# Patient Record
Sex: FEMALE | Race: WHITE | Employment: UNEMPLOYED | URBAN - METROPOLITAN AREA
[De-identification: names, ages, dates, MRNs, and addresses within clinical notes are randomized per-mention and may not be internally consistent; named-entity substitution may affect disease eponyms.]

---

## 2017-09-30 ENCOUNTER — HOSPITAL ENCOUNTER (INPATIENT)
Age: 67
LOS: 3 days | Discharge: HOME | DRG: 149 | End: 2017-10-03
Attending: EMERGENCY MEDICINE | Admitting: INTERNAL MEDICINE
Payer: MEDICARE

## 2017-09-30 ENCOUNTER — APPOINTMENT (OUTPATIENT)
Dept: MRI IMAGING | Age: 67
DRG: 149 | End: 2017-09-30
Attending: INTERNAL MEDICINE
Payer: MEDICARE

## 2017-09-30 ENCOUNTER — APPOINTMENT (OUTPATIENT)
Dept: CT IMAGING | Age: 67
DRG: 149 | End: 2017-09-30
Attending: EMERGENCY MEDICINE
Payer: MEDICARE

## 2017-09-30 DIAGNOSIS — I25.10 ASCVD (ARTERIOSCLEROTIC CARDIOVASCULAR DISEASE): ICD-10-CM

## 2017-09-30 DIAGNOSIS — H81.23 VESTIBULAR NEURONITIS OF BOTH EARS: ICD-10-CM

## 2017-09-30 DIAGNOSIS — E78.5 HYPERLIPIDEMIA, UNSPECIFIED HYPERLIPIDEMIA TYPE: ICD-10-CM

## 2017-09-30 DIAGNOSIS — R42 DIZZINESS: ICD-10-CM

## 2017-09-30 DIAGNOSIS — H81.20 VESTIBULAR NEURONITIS, UNSPECIFIED LATERALITY: ICD-10-CM

## 2017-09-30 DIAGNOSIS — R11.2 INTRACTABLE VOMITING WITH NAUSEA, UNSPECIFIED VOMITING TYPE: ICD-10-CM

## 2017-09-30 DIAGNOSIS — R11.2 NAUSEA AND VOMITING, INTRACTABILITY OF VOMITING NOT SPECIFIED, UNSPECIFIED VOMITING TYPE: ICD-10-CM

## 2017-09-30 DIAGNOSIS — E78.00 PURE HYPERCHOLESTEROLEMIA: ICD-10-CM

## 2017-09-30 DIAGNOSIS — I63.9 CEREBROVASCULAR ACCIDENT (CVA), UNSPECIFIED MECHANISM (HCC): ICD-10-CM

## 2017-09-30 DIAGNOSIS — R42 VERTIGO: Primary | ICD-10-CM

## 2017-09-30 LAB
ALBUMIN SERPL-MCNC: 4 G/DL (ref 3.5–5)
ALBUMIN/GLOB SERPL: 1.3 {RATIO} (ref 1.1–2.2)
ALP SERPL-CCNC: 58 U/L (ref 45–117)
ALT SERPL-CCNC: 28 U/L (ref 12–78)
ANION GAP SERPL CALC-SCNC: 6 MMOL/L (ref 5–15)
APTT PPP: 24.5 SEC (ref 22.1–32.5)
AST SERPL-CCNC: 20 U/L (ref 15–37)
BASOPHILS # BLD: 0 K/UL (ref 0–0.1)
BASOPHILS NFR BLD: 0 % (ref 0–1)
BILIRUB SERPL-MCNC: 0.5 MG/DL (ref 0.2–1)
BUN SERPL-MCNC: 18 MG/DL (ref 6–20)
BUN/CREAT SERPL: 25 (ref 12–20)
CALCIUM SERPL-MCNC: 8.1 MG/DL (ref 8.5–10.1)
CHLORIDE SERPL-SCNC: 108 MMOL/L (ref 97–108)
CK MB CFR SERPL CALC: 1 % (ref 0–2.5)
CK MB SERPL-MCNC: 1.6 NG/ML (ref 5–25)
CK SERPL-CCNC: 163 U/L (ref 26–192)
CO2 SERPL-SCNC: 26 MMOL/L (ref 21–32)
CREAT BLD-MCNC: 0.7 MG/DL (ref 0.6–1.3)
CREAT SERPL-MCNC: 0.72 MG/DL (ref 0.55–1.02)
EOSINOPHIL # BLD: 0 K/UL (ref 0–0.4)
EOSINOPHIL NFR BLD: 0 % (ref 0–7)
ERYTHROCYTE [DISTWIDTH] IN BLOOD BY AUTOMATED COUNT: 12.6 % (ref 11.5–14.5)
GLOBULIN SER CALC-MCNC: 3.1 G/DL (ref 2–4)
GLUCOSE SERPL-MCNC: 123 MG/DL (ref 65–100)
HCT VFR BLD AUTO: 36.9 % (ref 35–47)
HGB BLD-MCNC: 12.5 G/DL (ref 11.5–16)
INR PPP: 1 (ref 0.9–1.1)
LYMPHOCYTES # BLD: 0.9 K/UL (ref 0.8–3.5)
LYMPHOCYTES NFR BLD: 9 % (ref 12–49)
MAGNESIUM SERPL-MCNC: 1.8 MG/DL (ref 1.6–2.4)
MCH RBC QN AUTO: 30.1 PG (ref 26–34)
MCHC RBC AUTO-ENTMCNC: 33.9 G/DL (ref 30–36.5)
MCV RBC AUTO: 88.9 FL (ref 80–99)
MONOCYTES # BLD: 0.5 K/UL (ref 0–1)
MONOCYTES NFR BLD: 6 % (ref 5–13)
NEUTS SEG # BLD: 8.1 K/UL (ref 1.8–8)
NEUTS SEG NFR BLD: 85 % (ref 32–75)
PLATELET # BLD AUTO: 182 K/UL (ref 150–400)
POTASSIUM SERPL-SCNC: 3.6 MMOL/L (ref 3.5–5.1)
PROT SERPL-MCNC: 7.1 G/DL (ref 6.4–8.2)
PROTHROMBIN TIME: 10.3 SEC (ref 9–11.1)
RBC # BLD AUTO: 4.15 M/UL (ref 3.8–5.2)
SODIUM SERPL-SCNC: 140 MMOL/L (ref 136–145)
THERAPEUTIC RANGE,PTTT: NORMAL SECS (ref 58–77)
TROPONIN I SERPL-MCNC: <0.04 NG/ML
WBC # BLD AUTO: 9.5 K/UL (ref 3.6–11)

## 2017-09-30 PROCEDURE — 99285 EMERGENCY DEPT VISIT HI MDM: CPT

## 2017-09-30 PROCEDURE — 82550 ASSAY OF CK (CPK): CPT | Performed by: EMERGENCY MEDICINE

## 2017-09-30 PROCEDURE — 74011636320 HC RX REV CODE- 636/320: Performed by: EMERGENCY MEDICINE

## 2017-09-30 PROCEDURE — 96374 THER/PROPH/DIAG INJ IV PUSH: CPT

## 2017-09-30 PROCEDURE — 93005 ELECTROCARDIOGRAM TRACING: CPT

## 2017-09-30 PROCEDURE — 85025 COMPLETE CBC W/AUTO DIFF WBC: CPT | Performed by: EMERGENCY MEDICINE

## 2017-09-30 PROCEDURE — 80053 COMPREHEN METABOLIC PANEL: CPT | Performed by: EMERGENCY MEDICINE

## 2017-09-30 PROCEDURE — 74011250636 HC RX REV CODE- 250/636: Performed by: EMERGENCY MEDICINE

## 2017-09-30 PROCEDURE — 65660000000 HC RM CCU STEPDOWN

## 2017-09-30 PROCEDURE — 70450 CT HEAD/BRAIN W/O DYE: CPT

## 2017-09-30 PROCEDURE — 36415 COLL VENOUS BLD VENIPUNCTURE: CPT | Performed by: EMERGENCY MEDICINE

## 2017-09-30 PROCEDURE — 74011250637 HC RX REV CODE- 250/637: Performed by: EMERGENCY MEDICINE

## 2017-09-30 PROCEDURE — 70496 CT ANGIOGRAPHY HEAD: CPT

## 2017-09-30 PROCEDURE — 82565 ASSAY OF CREATININE: CPT

## 2017-09-30 PROCEDURE — 83735 ASSAY OF MAGNESIUM: CPT | Performed by: EMERGENCY MEDICINE

## 2017-09-30 PROCEDURE — 85730 THROMBOPLASTIN TIME PARTIAL: CPT | Performed by: EMERGENCY MEDICINE

## 2017-09-30 PROCEDURE — 74011250636 HC RX REV CODE- 250/636: Performed by: INTERNAL MEDICINE

## 2017-09-30 PROCEDURE — 85610 PROTHROMBIN TIME: CPT | Performed by: EMERGENCY MEDICINE

## 2017-09-30 PROCEDURE — 74011250637 HC RX REV CODE- 250/637: Performed by: INTERNAL MEDICINE

## 2017-09-30 PROCEDURE — 84484 ASSAY OF TROPONIN QUANT: CPT | Performed by: EMERGENCY MEDICINE

## 2017-09-30 RX ORDER — ATORVASTATIN CALCIUM 20 MG/1
20 TABLET, FILM COATED ORAL
Status: DISCONTINUED | OUTPATIENT
Start: 2017-09-30 | End: 2017-10-03 | Stop reason: HOSPADM

## 2017-09-30 RX ORDER — DIAZEPAM 5 MG/1
5 TABLET ORAL
Status: COMPLETED | OUTPATIENT
Start: 2017-09-30 | End: 2017-09-30

## 2017-09-30 RX ORDER — SODIUM CHLORIDE 9 MG/ML
75 INJECTION, SOLUTION INTRAVENOUS CONTINUOUS
Status: DISPENSED | OUTPATIENT
Start: 2017-09-30 | End: 2017-10-01

## 2017-09-30 RX ORDER — MECLIZINE HCL 12.5 MG 12.5 MG/1
25 TABLET ORAL
Status: COMPLETED | OUTPATIENT
Start: 2017-09-30 | End: 2017-09-30

## 2017-09-30 RX ORDER — SODIUM CHLORIDE 0.9 % (FLUSH) 0.9 %
10 SYRINGE (ML) INJECTION
Status: COMPLETED | OUTPATIENT
Start: 2017-09-30 | End: 2017-09-30

## 2017-09-30 RX ORDER — GUAIFENESIN 100 MG/5ML
81 LIQUID (ML) ORAL DAILY
Status: DISCONTINUED | OUTPATIENT
Start: 2017-10-01 | End: 2017-10-03 | Stop reason: HOSPADM

## 2017-09-30 RX ORDER — ACETAMINOPHEN 325 MG/1
650 TABLET ORAL
Status: DISCONTINUED | OUTPATIENT
Start: 2017-09-30 | End: 2017-10-03 | Stop reason: HOSPADM

## 2017-09-30 RX ORDER — MORPHINE SULFATE 4 MG/ML
1 INJECTION, SOLUTION INTRAMUSCULAR; INTRAVENOUS
Status: DISCONTINUED | OUTPATIENT
Start: 2017-09-30 | End: 2017-10-03 | Stop reason: HOSPADM

## 2017-09-30 RX ORDER — SODIUM CHLORIDE 0.9 % (FLUSH) 0.9 %
5-10 SYRINGE (ML) INJECTION EVERY 8 HOURS
Status: DISCONTINUED | OUTPATIENT
Start: 2017-09-30 | End: 2017-10-03 | Stop reason: HOSPADM

## 2017-09-30 RX ORDER — LABETALOL HYDROCHLORIDE 5 MG/ML
5 INJECTION, SOLUTION INTRAVENOUS
Status: DISCONTINUED | OUTPATIENT
Start: 2017-09-30 | End: 2017-10-03 | Stop reason: HOSPADM

## 2017-09-30 RX ORDER — ATORVASTATIN CALCIUM 20 MG/1
10 TABLET, FILM COATED ORAL DAILY
Status: ON HOLD | COMMUNITY
End: 2020-07-21

## 2017-09-30 RX ORDER — LORAZEPAM 2 MG/ML
2 INJECTION INTRAMUSCULAR ONCE
Status: COMPLETED | OUTPATIENT
Start: 2017-09-30 | End: 2017-09-30

## 2017-09-30 RX ORDER — SODIUM CHLORIDE 0.9 % (FLUSH) 0.9 %
5-10 SYRINGE (ML) INJECTION AS NEEDED
Status: DISCONTINUED | OUTPATIENT
Start: 2017-09-30 | End: 2017-10-03 | Stop reason: HOSPADM

## 2017-09-30 RX ORDER — MECLIZINE HCL 12.5 MG 12.5 MG/1
25 TABLET ORAL EVERY 6 HOURS
Status: DISCONTINUED | OUTPATIENT
Start: 2017-09-30 | End: 2017-10-03 | Stop reason: HOSPADM

## 2017-09-30 RX ORDER — SODIUM CHLORIDE 9 MG/ML
50 INJECTION, SOLUTION INTRAVENOUS
Status: COMPLETED | OUTPATIENT
Start: 2017-09-30 | End: 2017-09-30

## 2017-09-30 RX ORDER — LORAZEPAM 2 MG/ML
1 INJECTION INTRAMUSCULAR
Status: DISCONTINUED | OUTPATIENT
Start: 2017-09-30 | End: 2017-10-03 | Stop reason: HOSPADM

## 2017-09-30 RX ORDER — ONDANSETRON 2 MG/ML
4 INJECTION INTRAMUSCULAR; INTRAVENOUS
Status: DISCONTINUED | OUTPATIENT
Start: 2017-09-30 | End: 2017-10-03 | Stop reason: HOSPADM

## 2017-09-30 RX ORDER — ENOXAPARIN SODIUM 100 MG/ML
30 INJECTION SUBCUTANEOUS EVERY 24 HOURS
Status: DISCONTINUED | OUTPATIENT
Start: 2017-09-30 | End: 2017-10-03 | Stop reason: HOSPADM

## 2017-09-30 RX ORDER — MECLIZINE HCL 12.5 MG 12.5 MG/1
25 TABLET ORAL
Status: DISCONTINUED | OUTPATIENT
Start: 2017-09-30 | End: 2017-09-30

## 2017-09-30 RX ORDER — ONDANSETRON 2 MG/ML
4 INJECTION INTRAMUSCULAR; INTRAVENOUS
Status: COMPLETED | OUTPATIENT
Start: 2017-09-30 | End: 2017-09-30

## 2017-09-30 RX ORDER — ACETAMINOPHEN 650 MG/1
650 SUPPOSITORY RECTAL
Status: DISCONTINUED | OUTPATIENT
Start: 2017-09-30 | End: 2017-10-03 | Stop reason: HOSPADM

## 2017-09-30 RX ADMIN — MECLIZINE 25 MG: 12.5 TABLET ORAL at 14:29

## 2017-09-30 RX ADMIN — DIAZEPAM 5 MG: 5 TABLET ORAL at 16:39

## 2017-09-30 RX ADMIN — SODIUM CHLORIDE 50 ML/HR: 900 INJECTION, SOLUTION INTRAVENOUS at 15:03

## 2017-09-30 RX ADMIN — ATORVASTATIN CALCIUM 20 MG: 20 TABLET, FILM COATED ORAL at 22:34

## 2017-09-30 RX ADMIN — IOPAMIDOL 100 ML: 755 INJECTION, SOLUTION INTRAVENOUS at 15:03

## 2017-09-30 RX ADMIN — SODIUM CHLORIDE 75 ML/HR: 900 INJECTION, SOLUTION INTRAVENOUS at 18:57

## 2017-09-30 RX ADMIN — MECLIZINE 25 MG: 12.5 TABLET ORAL at 18:55

## 2017-09-30 RX ADMIN — ONDANSETRON 4 MG: 2 INJECTION INTRAMUSCULAR; INTRAVENOUS at 14:45

## 2017-09-30 RX ADMIN — ENOXAPARIN SODIUM 30 MG: 30 INJECTION SUBCUTANEOUS at 18:56

## 2017-09-30 RX ADMIN — Medication 10 ML: at 15:03

## 2017-09-30 RX ADMIN — Medication 10 ML: at 22:34

## 2017-09-30 RX ADMIN — LORAZEPAM 2 MG: 2 INJECTION INTRAMUSCULAR; INTRAVENOUS at 18:56

## 2017-09-30 NOTE — ED NOTES
Bedside and Verbal shift change report given to Kelsey Brito RN (oncoming nurse) by Opal Gonzalez RN (offgoing nurse). Report included the following information SBAR, Kardex, ED Summary, MAR, Recent Results and Cardiac Rhythm Sinus Rhythm.

## 2017-09-30 NOTE — ROUTINE PROCESS
TRANSFER - OUT REPORT:    Verbal report given to [facility] RN (name) on Joellen Shells  being transferred to Room 3118 Neuro Tele (unit) for routine progression of care       Report consisted of patients Situation, Background, Assessment and   Recommendations(SBAR). Information from the following report(s) SBAR, ED Summary, Intake/Output and Recent Results was reviewed with the receiving nurse. Lines:   Peripheral IV 09/30/17 Right Antecubital (Active)   Site Assessment Clean, dry, & intact 9/30/2017  2:32 PM   Phlebitis Assessment 0 9/30/2017  2:32 PM   Infiltration Assessment 0 9/30/2017  2:32 PM   Dressing Status Clean, dry, & intact 9/30/2017  2:32 PM   Dressing Type Tape;Transparent 9/30/2017  2:32 PM   Hub Color/Line Status Pink;Flushed 9/30/2017  2:32 PM       Peripheral IV 09/30/17 Left Antecubital (Active)   Site Assessment Clean, dry, & intact 9/30/2017  7:48 PM   Phlebitis Assessment 0 9/30/2017  7:48 PM   Infiltration Assessment 0 9/30/2017  7:48 PM   Dressing Status Clean, dry, & intact 9/30/2017  7:48 PM   Dressing Type Transparent 9/30/2017  7:48 PM   Hub Color/Line Status Pink;Flushed 9/30/2017  7:48 PM   Alcohol Cap Used Yes 9/30/2017  7:48 PM        Opportunity for questions and clarification was provided.

## 2017-09-30 NOTE — IP AVS SNAPSHOT
[address] [facility] 
[phone] Patient: Perry Madsen MRN: [id number] RCE:7/25/4580 Current Discharge Medication List  
  
START taking these medications Dose & Instructions Dispensing Information Comments Morning Noon Evening Bedtime  
 meclizine 25 mg tablet Commonly known as:  ANTIVERT Your last dose was: Your next dose is:    
   
   
 Dose:  25 mg Take 1 Tab by mouth three (3) times daily as needed for up to 10 days. Indications: VERTIGO Quantity:  21 Tab Refills:  0 CONTINUE these medications which have NOT CHANGED Dose & Instructions Dispensing Information Comments Morning Noon Evening Bedtime LIPITOR 20 mg tablet Generic drug:  atorvastatin Your last dose was: Your next dose is: Take  by mouth daily. Refills:  0 Where to Get Your Medications These medications were sent to [address] [place] [address], [address] [place] [address] [address]  [address], [address] [place] [address] Phone:  [phone]  
  meclizine 25 mg tablet

## 2017-09-30 NOTE — Clinical Note
Status[de-identified] Inpatient [101] Type of Bed: Telemetry [19] Inpatient Hospitalization Certified Necessary for the Following Reasons: 3. Patient receiving treatment that can only be provided in an inpatient setting (further clarification in H&P documentation) Admitting Diagnosis: CVA (cerebral vascular accident) [facility]) [[id number]] Ad[facility] Mohsen Lewis, [facility] Attending Physician: Natalya Lin Estimated Length of Stay: 2 Midnights Discharge Plan[de-identified] Home with Office Follow-up

## 2017-09-30 NOTE — H&P
Hospitalist Admission Note    NAME: Juanita Horn   :  1950   MRN:  [id number]     Date/Time:  2017 5:22 PM    Patient PCP: Imelda South MD  ________________________________________________________________________    My assessment of this patient's clinical condition and my plan of care is as follows. Assessment / Plan:  CVA vs BPV  -presenting symptoms: dizziness with intermittent nausea, vomiting  -head CT negative for acute process  -CTA neg for major vessel occlusion, aneurysm, dissection, intraluminal thrombus, or significant stenosis  -admit to telemetry  -will order MRI to r/o posterior circ CVA  -Echo ordered  -check CVA labs: TSH, lipid panel, A1C  -labetalol prn for permissive HTN  -scheduled meclizine, ativan prn for symptom control  -PT/OT/CM   -neurology consulted    CAD  Hyperlipidemia  -on statin, will resume  -pt follows with Dr Maddison Tsang for ectopy. Had multiple CC MRI and cardiac stress test in the past with negative result        Code Status: Full  Surrogate Decision Maker: daughter and   DVT Prophylaxis: lovenox  GI Prophylaxis: not indicated  Baseline: independent        Subjective:   CHIEF COMPLAINT: dizziness, nausea, vomiting    HISTORY OF PRESENT ILLNESS:     Juanita Horn is a 74 y.o.  female w pmhx significant for CAD and factor V heterogenous, present to ED complaining of dizziness associated with nausea, photophobia, and vomiting, all started earlier today while driving. Denies any similar symptoms in the past.  She states she was driving to her daughter's house today and had to pull over due to severe dizziness with distortion of her vision. Daughter picked pt up and that's when she vomited multiple times prior to ED arrival.  Other associated symptoms including, generalized weakness with unsteadiness with ambulation and room spinning with positional changes.   She denies any fever, chills, HA,   In the ER, vitals: T98, P 63, NINA 165/78, SpO2 99%  Labs reviewed, UA neg . CT head negative, CTA neg. We were asked to admit for work up and evaluation of the above problems. Past Medical History:   Diagnosis Date    CAD (coronary artery disease)         History reviewed. No pertinent surgical history. Social History   Substance Use Topics    Smoking status: Never Smoker    Smokeless tobacco: Not on file    Alcohol use No        History reviewed. No pertinent family history. No Known Allergies     Prior to Admission medications    Medication Sig Start Date End Date Taking? Authorizing Provider   atorvastatin (LIPITOR) 20 mg tablet Take  by mouth daily. Yes Phys Other, MD       REVIEW OF SYSTEMS:     I am not able to complete the review of systems because:    The patient is intubated and sedated    The patient has altered mental status due to his acute medical problems    The patient has baseline aphasia from prior stroke(s)    The patient has baseline dementia and is not reliable historian    The patient is in acute medical distress and unable to provide information           Total of 12 systems reviewed as follows:       POSITIVE= BOLD text  Negative = text not underlined  General:  fever, chills, sweats, generalized weakness, weight loss/gain,      loss of appetite   Eyes:    blurred vision, eye pain, loss of vision, double vision  ENT:    rhinorrhea, pharyngitis   Respiratory:   cough, sputum production, SOB, LÓPEZ, wheezing, pleuritic pain   Cardiology:   chest pain, palpitations, orthopnea, PND, edema, syncope   Gastrointestinal:  abdominal pain , N/V, diarrhea, dysphagia, constipation, bleeding   Genitourinary:  frequency, urgency, dysuria, hematuria, incontinence   Muskuloskeletal :  arthralgia, myalgia, back pain  Hematology:  easy bruising, nose or gum bleeding, lymphadenopathy   Dermatological: rash, ulceration, pruritis, color change / jaundice  Endocrine:   hot flashes or polydipsia   Neurological: headache, dizziness, confusion, focal weakness, paresthesia,     Speech difficulties, memory loss, gait difficulty  Psychological: Feelings of anxiety, depression, agitation    Objective:   VITALS:    Visit Vitals    /71    Pulse 61    Temp 98 °F (36.7 °C)    Resp 15    Ht 5' 7\" (1.702 m)    Wt 59 kg (130 lb)    SpO2 99%    BMI 20.36 kg/m2       PHYSICAL EXAM:    General:    Alert, cooperative, no distress, appears stated age. HEENT: Atraumatic, anicteric sclerae, b/l horizontal nystagmus     No oral ulcers, mucosa moist, throat clear, dentition fair  Neck:  Supple, symmetrical,  thyroid: non tender  Lungs:   Clear to auscultation bilaterally. No Wheezing or Rhonchi. No rales. Chest wall:  No tenderness  No Accessory muscle use. Heart:   Regular  rhythm,  No  murmur   No edema  Abdomen:   Soft, non-tender. Not distended. Bowel sounds normal  Extremities: No cyanosis. No clubbing,      Skin turgor normal, Capillary refill normal, Radial dial pulse 2+  Skin:     Not pale. Not Jaundiced  No rashes   Psych:   Not anxious or agitated. Neurologic: B/l resting nystagmus. No facial asymmetry. No aphasia or slurred speech. Symmetrical strength, Sensation grossly intact.  Alert and oriented X 4.     _______________________________________________________________________  Care Plan discussed with:    Comments   Patient x    Family      RN x    Care Manager                    Consultant:  x ED physician   _______________________________________________________________________  Expected  Disposition:   Home with Family    HH/PT/OT/RN x   SNF/LTC    DARRION    ________________________________________________________________________  TOTAL TIME:  72 Minutes    Critical Care Provided     Minutes non procedure based      Comments    x Reviewed previous records   >50% of visit spent in counseling and coordination of care  Discussion with patient and/or family and questions answered ________________________________________________________________________  Signed: Micha Manzanares MD    Procedures: see electronic medical records for all procedures/Xrays and details which were not copied into this note but were reviewed prior to creation of Plan. LAB DATA REVIEWED:    Recent Results (from the past 24 hour(s))   EKG, 12 LEAD, INITIAL    Collection Time: 09/30/17 12:22 PM   Result Value Ref Range    Ventricular Rate 59 BPM    Atrial Rate 59 BPM    P-R Interval 142 ms    QRS Duration 98 ms    Q-T Interval 472 ms    QTC Calculation (Bezet) 467 ms    Calculated P Axis 58 degrees    Calculated R Axis 34 degrees    Calculated T Axis 33 degrees    Diagnosis       Sinus bradycardia  Otherwise normal ECG  No previous ECGs available     CBC WITH AUTOMATED DIFF    Collection Time: 09/30/17  2:33 PM   Result Value Ref Range    WBC 9.5 3.6 - 11.0 K/uL    RBC 4.15 3.80 - 5.20 M/uL    HGB 12.5 11.5 - 16.0 g/dL    HCT 36.9 35.0 - 47.0 %    MCV 88.9 80.0 - 99.0 FL    MCH 30.1 26.0 - 34.0 PG    MCHC 33.9 30.0 - 36.5 g/dL    RDW 12.6 11.5 - 14.5 %    PLATELET 716 462 - 758 K/uL    NEUTROPHILS 85 (H) 32 - 75 %    LYMPHOCYTES 9 (L) 12 - 49 %    MONOCYTES 6 5 - 13 %    EOSINOPHILS 0 0 - 7 %    BASOPHILS 0 0 - 1 %    ABS. NEUTROPHILS 8.1 (H) 1.8 - 8.0 K/UL    ABS. LYMPHOCYTES 0.9 0.8 - 3.5 K/UL    ABS. MONOCYTES 0.5 0.0 - 1.0 K/UL    ABS. EOSINOPHILS 0.0 0.0 - 0.4 K/UL    ABS.  BASOPHILS 0.0 0.0 - 0.1 K/UL   METABOLIC PANEL, COMPREHENSIVE    Collection Time: 09/30/17  2:33 PM   Result Value Ref Range    Sodium 140 136 - 145 mmol/L    Potassium 3.6 3.5 - 5.1 mmol/L    Chloride 108 97 - 108 mmol/L    CO2 26 21 - 32 mmol/L    Anion gap 6 5 - 15 mmol/L    Glucose 123 (H) 65 - 100 mg/dL    BUN 18 6 - 20 MG/DL    Creatinine 0.72 0.55 - 1.02 MG/DL    BUN/Creatinine ratio 25 (H) 12 - 20      GFR est AA >60 >60 ml/min/1.73m2    GFR est non-AA >60 >60 ml/min/1.73m2    Calcium 8.1 (L) 8.5 - 10.1 MG/DL    Bilirubin, total 0.5 0.2 - 1.0 MG/DL    ALT (SGPT) 28 12 - 78 U/L    AST (SGOT) 20 15 - 37 U/L    Alk.  phosphatase 58 45 - 117 U/L    Protein, total 7.1 6.4 - 8.2 g/dL    Albumin 4.0 3.5 - 5.0 g/dL    Globulin 3.1 2.0 - 4.0 g/dL    A-G Ratio 1.3 1.1 - 2.2     CK W/ CKMB & INDEX    Collection Time: 09/30/17  2:33 PM   Result Value Ref Range     26 - 192 U/L    CK - MB 1.6 <3.6 NG/ML    CK-MB Index 1.0 0 - 2.5     MAGNESIUM    Collection Time: 09/30/17  2:33 PM   Result Value Ref Range    Magnesium 1.8 1.6 - 2.4 mg/dL   TROPONIN I    Collection Time: 09/30/17  2:33 PM   Result Value Ref Range    Troponin-I, Qt. <0.04 <0.05 ng/mL   PROTHROMBIN TIME + INR    Collection Time: 09/30/17  2:33 PM   Result Value Ref Range    INR 1.0 0.9 - 1.1      Prothrombin time 10.3 9.0 - 11.1 sec   PTT    Collection Time: 09/30/17  2:33 PM   Result Value Ref Range    aPTT 24.5 22.1 - 32.5 sec    aPTT, therapeutic range     58.0 - 77.0 SECS   POC CREATININE    Collection Time: 09/30/17  2:34 PM   Result Value Ref Range    Creatinine (POC) 0.7 0.6 - 1.3 MG/DL    GFRAA, POC >60 >60 ml/min/1.73m2    GFRNA, POC >60 >60 ml/min/1.73m2

## 2017-09-30 NOTE — ED NOTES
Bedside shift change report given to Kelsey Brito RN (oncoming nurse) by Gopal Philip (offgoing nurse). Report included the following information SBAR, ED Summary, MAR and Recent Results. Assumed care of patient who is lying quietly on the stretcher in no apparent distress. Pt is alert and oriented x 4. Respirations are even and unlabored. No needs are expressed at this time.

## 2017-09-30 NOTE — IP AVS SNAPSHOT
[address] [facility] 
[phone] Patient: Qiana Reyes MRN: [id number] VGD:9/15/8389 You are allergic to the following No active allergies Recent Documentation Height Weight Breastfeeding? BMI OB Status Smoking Status 1.702 m 59 kg No 20.36 kg/m2 Postmenopausal Never Smoker Emergency Contacts Name Discharge Info Relation Home Work Mobile [address] CAREGIVER [3] Spouse [3] [phone] [address] [facility] CAREGIVER [3] Child [2] [phone] Jacky Alvarado CAREGIVER [3]  [phone] Mortimer Fish  Sister [23] [phone] About your hospitalization You were admitted on:  September 30, 2017 You last received care in the:  [facility] 3 NEUROSCIENCE TELEMETRY You were discharged on:  October 3, 2017 Unit phone number:  [phone] Why you were hospitalized Your primary diagnosis was:  Vestibular Neuronitis Of Both Ears Your diagnoses also included:  Dizziness, Nausea & Vomiting, Hyperlipidemia, Ascvd (Arteriosclerotic Cardiovascular Disease) Providers Seen During Your Hospitalizations Provider Role Specialty Primary office phone Angelica Del Rio DO Attending Provider Emergency Medicine [phone] Leodan Dhaliwal MD Attending Provider Internal Medicine [phone] Your Primary Care Physician (PCP) Primary Care Physician Office Phone Office Fax Niecy Genao [phone] Follow-up Information Follow up With Details Comments Contact Info Leodan Dhaliwal MD Schedule an appointment as soon as possible for a visit on 10/9/2017 You have a follow up visit scheduled for 1:00PM. Favian Aaron [facility] 
[phone] [facility] On 10/3/2017 This is the provider of your rolling walker. Please contact them with any questions. [address] [place] [address] 
[phone] [facility] On 10/3/2017 This will be the provider of your outpatient therapy. Please contact them with any questions. [phone] Your Appointments 2017  1:10 PM EDT Follow Up with MD Gerda Darling 26 ([address] [facility]) [address] [address] [phone] Current Discharge Medication List  
  
START taking these medications Dose & Instructions Dispensing Information Comments Morning Noon Evening Bedtime  
 meclizine 25 mg tablet Commonly known as:  ANTIVERT Your last dose was: Your next dose is:    
   
   
 Dose:  25 mg Take 1 Tab by mouth three (3) times daily as needed for up to 10 days. Indications: VERTIGO Quantity:  21 Tab Refills:  0 CONTINUE these medications which have NOT CHANGED Dose & Instructions Dispensing Information Comments Morning Noon Evening Bedtime LIPITOR 20 mg tablet Generic drug:  atorvastatin Your last dose was: Your next dose is: Take  by mouth daily. Refills:  0 Where to Get Your Medications These medications were sent to [address]  [address], Formerly named [facility] [address] [address] Phone:  [phone]  
  meclizine 25 mg tablet Discharge Instructions [facility] 76. [address] 
[phone] Patient Discharge Instructions [facility] / [id number] : 1950 Admitted 2017 Discharged: 10/3/2017 Take Home Medications · It is important that you take the medication exactly as they are prescribed. · Keep your medication in the bottles provided by the pharmacist and keep a list of the medication names, dosages, and times to be taken in your wallet. · Do not take other medications without consulting your doctor. What to do at [facility] Recommended diet: Regular Diet, Recommended activity: Activity as tolerated, Follow-up with Dr Adrian Kramer  in 1 week Information obtained by : 
I understand that if any problems occur once I am at home I am to contact my physician. I understand and acknowledge receipt of the instructions indicated above. Physician's or R.N.'s Signature                                                                  Date/Time Patient or Representative Signature                                                          Date/Time Discharge Orders None [employer] Announcement We are excited to announce that we are making your provider's discharge notes available to you in Kensho. You will see these notes when they are completed and signed by the physician that discharged you from your recent hospital stay. If you have any questions or concerns about any information you see in Kensho, please call the Health Information Department where you were seen or reach out to your Primary Care Provider for more information about your plan of care. Introducing [facility]! [facility] introduces K[employer]ho patient portal. Now you can access parts of your medical record, email your doctor's office, and request medication refills online. 1. In your internet browser, go to [email or web address] 2. Click on the First Time User? Click Here link in the Sign In box. You will see the New Member Sign Up page. 3. Enter your [employer] Access Code exactly as it appears below. You will not need to use this code after youve completed the sign-up process. If you do not sign up before the expiration date, you must request a new code. · [employer] Access Code: [email or web address] Expires: 12/29/2017 12:41 PM 
 
4. Enter the last four digits of your Social Security Number (xxxx) and Date of Birth (mm/dd/yyyy) as indicated and click Submit. You will be taken to the next sign-up page. 5. Create a [employer] ID. This will be your [employer] login ID and cannot be changed, so think of one that is secure and easy to remember. 6. Create a [employer] password. You can change your password at any time. 7. Enter your Password Reset Question and Answer. This can be used at a later time if you forget your password. 8. Enter your e-mail address. You will receive e-mail notification when new information is available in [address] 9. Click Sign Up. You can now view and download portions of your medical record. 10. Click the Download Summary menu link to download a portable copy of your medical information. If you have questions, please visit the Frequently Asked Questions section of the [employer] website. Remember, [employer] is NOT to be used for urgent needs. For medical emergencies, dial 911. Now available from your iPhone and Android! General Information Please provide this summary of care documentation to your next provider. Patient Signature:  ____________________________________________________________ Date:  ____________________________________________________________  
  
Greg Robison Provider Signature:  ____________________________________________________________ Date:  ____________________________________________________________

## 2017-09-30 NOTE — ED NOTES
Assist pt on and off bedpan. Pt is still dizzy. Pt did void on bedpan  She asked for an ice chip and a pillow  Having dry heave but not vomiting.    Pt is pale

## 2017-09-30 NOTE — ED PROVIDER NOTES
HPI Comments: Lola Lopez is a 74 y.o. female with PMhx significant for CAD and Factor V issues who presents via EMS with her family to the ED with cc of constant dizziness with intermittent nausea, chills, and photophobia. Pt states that she ate breakfast of eggs, toast, tea, and vitamin supplements. She then began driving to her daughter's house. She began to feel dizzy, but it passed after she pulled over for a few minutes. After she began driving again, she felt dizzy and nauseous. The nausea onset approximately 15 minutes after the dizziness. She pulled over and vomited once. Her dizziness feels like \"room spinning\". She was unable to walk due to feeling \"unsteady\" and called EMS. She vomited once en route and was administered 4 Zofran. In the ED, her nausea is mild but notes \"an effort to talk\". Pt takes Lipitor and had a 550 on the calcium heart scale when measured 3 years ago. Pt specifically denies cough/cold symptoms, headache, dysuria, hematuria, chest pain, SOB, taking blood thinners, or a history of kidney disease or vertigo. Social Hx: - Tobacco, - EtOH, - Illicit Drugs    PCP: Davidson Rutherford MD    There are no other complaints, changes or physical findings at this time. The history is provided by the patient. No  was used. Past Medical History:   Diagnosis Date    CAD (coronary artery disease)      History reviewed. No pertinent surgical history. History reviewed. No pertinent family history. Social History     Social History    Marital status:      Spouse name: N/A    Number of children: N/A    Years of education: N/A     Occupational History    Not on file.      Social History Main Topics    Smoking status: Never Smoker    Smokeless tobacco: Not on file    Alcohol use No    Drug use: No    Sexual activity: No     Other Topics Concern    Not on file     Social History Narrative     ALLERGIES: Review of patient's allergies indicates no known allergies. Review of Systems   Constitutional: Positive for chills. Negative for appetite change, fatigue and fever. HENT: Negative. Negative for congestion, ear pain, rhinorrhea, sinus pressure and sore throat. Eyes: Positive for photophobia. Respiratory: Negative. Negative for cough, choking, chest tightness, shortness of breath and wheezing. Cardiovascular: Negative. Negative for chest pain, palpitations and leg swelling. Gastrointestinal: Positive for nausea and vomiting. Negative for abdominal pain, constipation and diarrhea. Endocrine: Negative. Genitourinary: Negative. Negative for difficulty urinating, dysuria, flank pain, hematuria and urgency. Musculoskeletal: Negative. Skin: Negative. Neurological: Positive for dizziness. Negative for speech difficulty, weakness, light-headedness, numbness and headaches. Psychiatric/Behavioral: Negative. All other systems reviewed and are negative. Patient Vitals for the past 12 hrs:   Temp Pulse Resp BP SpO2   09/30/17 1600 - 61 15 139/71 99 %   09/30/17 1530 - 62 13 151/75 99 %   09/30/17 1430 - (!) 59 14 - 99 %   09/30/17 1400 - 60 13 152/69 -   09/30/17 1330 - 63 11 162/67 -   09/30/17 1300 - (!) 59 9 159/78 98 %   09/30/17 1222 98 °F (36.7 °C) 63 13 165/78 98 %     Physical Exam   Constitutional: She is oriented to person, place, and time. She appears well-developed and well-nourished. She appears distressed (Laying on stretcher, no movement, eyes closed). HENT:   Head: Normocephalic and atraumatic. Right Ear: External ear normal.   Left Ear: External ear normal.   Mouth/Throat: Oropharynx is clear and moist. No oropharyngeal exudate. Eyes: Conjunctivae and EOM are normal. Pupils are equal, round, and reactive to light. Neck: Normal range of motion. Neck supple. No JVD present. No tracheal deviation present. Cardiovascular: Normal rate, regular rhythm, normal heart sounds and intact distal pulses. No murmur heard. Pulmonary/Chest: Effort normal and breath sounds normal. No stridor. No respiratory distress. She has no wheezes. She has no rales. She exhibits no tenderness. Abdominal: Soft. She exhibits no distension. There is no tenderness. There is no rebound and no guarding. Musculoskeletal: Normal range of motion. She exhibits no edema or tenderness. Neurological: She is alert and oriented to person, place, and time. No cranial nerve deficit. No focal motor or sensory deficits, no dysmetria, resting nystagmus- non-fatigable   Skin: Skin is warm and dry. She is not diaphoretic. Psychiatric: She has a normal mood and affect. Her behavior is normal.   Nursing note and vitals reviewed. MDM  Number of Diagnoses or Management Options  Vertigo:   Diagnosis management comments: DDx: Cerebral stroke, vertigo, head bleed, vertebral artery dissection        Amount and/or Complexity of Data Reviewed  Clinical lab tests: ordered and reviewed  Tests in the radiology section of CPT®: ordered and reviewed  Tests in the medicine section of CPT®: ordered and reviewed  Review and summarize past medical records: yes  Discuss the patient with other providers: yes (Hospitalist)  Independent visualization of images, tracings, or specimens: yes    Patient Progress  Patient progress: stable    ED Course     Procedures     PROGRESS NOTE:  4:10 PM  Pt is still extremely dizzy. CONSULT NOTE:  4:15 PM  Mark Hammans, DO spoke with Dr. Charito Hutton,  Specialty: Hospitalist  Discussed pt's hx, disposition, and available diagnostic and imaging results. Reviewed care plans. Consultant recommends admitting the pt.      LABORATORY TESTS:  Recent Results (from the past 12 hour(s))   EKG, 12 LEAD, INITIAL    Collection Time: 09/30/17 12:22 PM   Result Value Ref Range    Ventricular Rate 59 BPM    Atrial Rate 59 BPM    P-R Interval 142 ms    QRS Duration 98 ms    Q-T Interval 472 ms    QTC Calculation (Bezet) 467 ms    Calculated P Axis 58 degrees    Calculated R Axis 34 degrees    Calculated T Axis 33 degrees    Diagnosis       Sinus bradycardia  Otherwise normal ECG  No previous ECGs available     CBC WITH AUTOMATED DIFF    Collection Time: 09/30/17  2:33 PM   Result Value Ref Range    WBC 9.5 3.6 - 11.0 K/uL    RBC 4.15 3.80 - 5.20 M/uL    HGB 12.5 11.5 - 16.0 g/dL    HCT 36.9 35.0 - 47.0 %    MCV 88.9 80.0 - 99.0 FL    MCH 30.1 26.0 - 34.0 PG    MCHC 33.9 30.0 - 36.5 g/dL    RDW 12.6 11.5 - 14.5 %    PLATELET 295 858 - 163 K/uL    NEUTROPHILS 85 (H) 32 - 75 %    LYMPHOCYTES 9 (L) 12 - 49 %    MONOCYTES 6 5 - 13 %    EOSINOPHILS 0 0 - 7 %    BASOPHILS 0 0 - 1 %    ABS. NEUTROPHILS 8.1 (H) 1.8 - 8.0 K/UL    ABS. LYMPHOCYTES 0.9 0.8 - 3.5 K/UL    ABS. MONOCYTES 0.5 0.0 - 1.0 K/UL    ABS. EOSINOPHILS 0.0 0.0 - 0.4 K/UL    ABS. BASOPHILS 0.0 0.0 - 0.1 K/UL   METABOLIC PANEL, COMPREHENSIVE    Collection Time: 09/30/17  2:33 PM   Result Value Ref Range    Sodium 140 136 - 145 mmol/L    Potassium 3.6 3.5 - 5.1 mmol/L    Chloride 108 97 - 108 mmol/L    CO2 26 21 - 32 mmol/L    Anion gap 6 5 - 15 mmol/L    Glucose 123 (H) 65 - 100 mg/dL    BUN 18 6 - 20 MG/DL    Creatinine 0.72 0.55 - 1.02 MG/DL    BUN/Creatinine ratio 25 (H) 12 - 20      GFR est AA >60 >60 ml/min/1.73m2    GFR est non-AA >60 >60 ml/min/1.73m2    Calcium 8.1 (L) 8.5 - 10.1 MG/DL    Bilirubin, total 0.5 0.2 - 1.0 MG/DL    ALT (SGPT) 28 12 - 78 U/L    AST (SGOT) 20 15 - 37 U/L    Alk.  phosphatase 58 45 - 117 U/L    Protein, total 7.1 6.4 - 8.2 g/dL    Albumin 4.0 3.5 - 5.0 g/dL    Globulin 3.1 2.0 - 4.0 g/dL    A-G Ratio 1.3 1.1 - 2.2     CK W/ CKMB & INDEX    Collection Time: 09/30/17  2:33 PM   Result Value Ref Range     26 - 192 U/L    CK - MB 1.6 <3.6 NG/ML    CK-MB Index 1.0 0 - 2.5     MAGNESIUM    Collection Time: 09/30/17  2:33 PM   Result Value Ref Range    Magnesium 1.8 1.6 - 2.4 mg/dL   TROPONIN I    Collection Time: 09/30/17  2:33 PM   Result Value Ref Range Troponin-I, Qt. <0.04 <0.05 ng/mL   PROTHROMBIN TIME + INR    Collection Time: 09/30/17  2:33 PM   Result Value Ref Range    INR 1.0 0.9 - 1.1      Prothrombin time 10.3 9.0 - 11.1 sec   PTT    Collection Time: 09/30/17  2:33 PM   Result Value Ref Range    aPTT 24.5 22.1 - 32.5 sec    aPTT, therapeutic range     58.0 - 77.0 SECS   POC CREATININE    Collection Time: 09/30/17  2:34 PM   Result Value Ref Range    Creatinine (POC) 0.7 0.6 - 1.3 MG/DL    GFRAA, POC >60 >60 ml/min/1.73m2    GFRNA, POC >60 >60 ml/min/1.73m2     IMAGING RESULTS:  CT Results  (Last 48 hours)               09/30/17 1503  CT HEAD WO CONT Final result    Impression:  IMPRESSION: No acute intracranial process seen       Narrative:  EXAM:  CT HEAD WO CONT       INDICATION:   Severe vertigo. Sudden onset of dizziness, nausea, and vomiting   today while driving. COMPARISON: None. CONTRAST:  None. TECHNIQUE: Unenhanced CT of the head was performed using 5 mm images. Brain and   bone windows were generated. CT dose reduction was achieved through use of a   standardized protocol tailored for this examination and automatic exposure   control for dose modulation. FINDINGS:   The ventricles and sulci are normal in size, shape and configuration and   midline. There is no significant white matter disease. There is no intracranial   hemorrhage, extra-axial collection, mass, mass effect or midline shift. The   basilar cisterns are open. No acute infarct is identified. The bone windows   demonstrate no abnormalities. The visualized portions of the paranasal sinuses   and mastoid air cells are clear. 09/30/17 1503  CTA CODE NEURO HEAD AND NECK W CONT Final result    Impression:  IMPRESSION: No major vessel occlusion, aneurysm, dissection, intraluminal   thrombus, or significant stenosis.        Narrative:  INDICATION:  Severe vertigo, resting nystagmus, ataxic gait        CTA Head and CTA Neck performed with helical axial imaging with bolus injection   of 100 mL Isovue 370 contrast with post processing performed, with sagittal and   coronal MIPS and 3D Shaded Surface Display provided. Postenhanced Head CT images   provided. CT dose reduction was achieved through the use of a standardized   protocol tailored for this examination and automatic exposure control for dose   modulation. NECK:   Carotid Stenosis Assessment (NASCET criteria) Right 0%   Left: 10%       Origin of the major brachiocephalic arteries from the aortic arch show no   significant stenosis. Left vertebral artery arises directly from the aorta. Vertebral arteries are patent, codominant and without significant stenosis. HEAD:   Intracranial circulation shows no major vessel occlusion, intraluminal thrombus,   aneurysm, AVM or evidence of irregularity suggestive of vasculitis. Images of the brain show no bleed, mass, shift, hydrocephalus,  or abnormal   enhancement. MEDICATIONS GIVEN:  Medications   diazePAM (VALIUM) tablet 5 mg (not administered)   meclizine (ANTIVERT) tablet 25 mg (25 mg Oral Given 9/30/17 1429)   ondansetron (ZOFRAN) injection 4 mg (4 mg IntraVENous Given 9/30/17 1445)   0.9% sodium chloride infusion (0 mL/hr IntraVENous IV Completed 9/30/17 1516)   iopamidol (ISOVUE-370) 76 % injection 100 mL (100 mL IntraVENous Given 9/30/17 1503)   sodium chloride (NS) flush 10 mL (10 mL IntraVENous Given 9/30/17 1503)     IMPRESSION:  1. Vertigo      PLAN: Admit to Hospital     Admit Note:  4:23 PM  Patient is being admitted to the hospital by Dr. Fanny Navas. The results of their tests and reasons for their admission have been discussed with them and/or available family. They convey agreement and understanding for the need to be admitted and for their admission diagnosis. Consultation has been made with the inpatient physician specialist for hospitalization. Attestation:   This note is prepared by Femi Mendieta acting as Scribe for DO General Crispin Meza DO: The scribe's documentation has been prepared under my direction and personally reviewed by me in its entirety. I confirm that the note above accurately reflects all work, treatment, procedures, and medical decision making performed by me.

## 2017-09-30 NOTE — ED NOTES
Patient reports to ED with complaints of dizziness that began while she was driving. Patient states she then was nauseated and pulled over to the side when she threw up. Patient states she got out the car and laid down on the ground when she began to fell better. Patient called EMS and was given 4 of zofran. Patient also vomited in route to hospital.  Patient denies chest pain, sob. However, she reports some dizziness. Patient on the monitor x3, call bell within reach, side rails x2.

## 2017-10-01 ENCOUNTER — APPOINTMENT (OUTPATIENT)
Dept: MRI IMAGING | Age: 67
DRG: 149 | End: 2017-10-01
Attending: PSYCHIATRY & NEUROLOGY
Payer: MEDICARE

## 2017-10-01 PROBLEM — I25.10 ASCVD (ARTERIOSCLEROTIC CARDIOVASCULAR DISEASE): Status: ACTIVE | Noted: 2017-10-01

## 2017-10-01 PROBLEM — R42 DIZZINESS: Status: ACTIVE | Noted: 2017-10-01

## 2017-10-01 PROBLEM — E78.5 HYPERLIPIDEMIA: Status: ACTIVE | Noted: 2017-10-01

## 2017-10-01 PROBLEM — R11.2 NAUSEA & VOMITING: Status: ACTIVE | Noted: 2017-10-01

## 2017-10-01 LAB
ATRIAL RATE: 59 BPM
CALCULATED P AXIS, ECG09: 58 DEGREES
CALCULATED R AXIS, ECG10: 34 DEGREES
CALCULATED T AXIS, ECG11: 33 DEGREES
CHOLEST SERPL-MCNC: 147 MG/DL
DIAGNOSIS, 93000: NORMAL
EST. AVERAGE GLUCOSE BLD GHB EST-MCNC: 111 MG/DL
HBA1C MFR BLD: 5.5 % (ref 4.2–6.3)
HDLC SERPL-MCNC: 66 MG/DL
HDLC SERPL: 2.2 {RATIO} (ref 0–5)
LDLC SERPL CALC-MCNC: 68.8 MG/DL (ref 0–100)
LIPID PROFILE,FLP: NORMAL
P-R INTERVAL, ECG05: 142 MS
Q-T INTERVAL, ECG07: 472 MS
QRS DURATION, ECG06: 98 MS
QTC CALCULATION (BEZET), ECG08: 467 MS
TRIGL SERPL-MCNC: 61 MG/DL (ref ?–150)
TSH SERPL DL<=0.05 MIU/L-ACNC: 1.64 UIU/ML (ref 0.36–3.74)
VENTRICULAR RATE, ECG03: 59 BPM
VLDLC SERPL CALC-MCNC: 12.2 MG/DL

## 2017-10-01 PROCEDURE — 97530 THERAPEUTIC ACTIVITIES: CPT

## 2017-10-01 PROCEDURE — 36415 COLL VENOUS BLD VENIPUNCTURE: CPT | Performed by: INTERNAL MEDICINE

## 2017-10-01 PROCEDURE — 97165 OT EVAL LOW COMPLEX 30 MIN: CPT

## 2017-10-01 PROCEDURE — 80061 LIPID PANEL: CPT | Performed by: INTERNAL MEDICINE

## 2017-10-01 PROCEDURE — G8978 MOBILITY CURRENT STATUS: HCPCS

## 2017-10-01 PROCEDURE — 97162 PT EVAL MOD COMPLEX 30 MIN: CPT

## 2017-10-01 PROCEDURE — 84443 ASSAY THYROID STIM HORMONE: CPT | Performed by: INTERNAL MEDICINE

## 2017-10-01 PROCEDURE — 65660000000 HC RM CCU STEPDOWN

## 2017-10-01 PROCEDURE — 70547 MR ANGIOGRAPHY NECK W/O DYE: CPT

## 2017-10-01 PROCEDURE — G8988 SELF CARE GOAL STATUS: HCPCS

## 2017-10-01 PROCEDURE — 74011250636 HC RX REV CODE- 250/636: Performed by: INTERNAL MEDICINE

## 2017-10-01 PROCEDURE — 70551 MRI BRAIN STEM W/O DYE: CPT

## 2017-10-01 PROCEDURE — G8979 MOBILITY GOAL STATUS: HCPCS

## 2017-10-01 PROCEDURE — 70544 MR ANGIOGRAPHY HEAD W/O DYE: CPT

## 2017-10-01 PROCEDURE — G8987 SELF CARE CURRENT STATUS: HCPCS

## 2017-10-01 PROCEDURE — 83036 HEMOGLOBIN GLYCOSYLATED A1C: CPT | Performed by: INTERNAL MEDICINE

## 2017-10-01 PROCEDURE — 74011250637 HC RX REV CODE- 250/637: Performed by: INTERNAL MEDICINE

## 2017-10-01 RX ADMIN — ASPIRIN 81 MG 81 MG: 81 TABLET ORAL at 08:34

## 2017-10-01 RX ADMIN — ATORVASTATIN CALCIUM 10 MG: 20 TABLET, FILM COATED ORAL at 21:47

## 2017-10-01 RX ADMIN — MECLIZINE 25 MG: 12.5 TABLET ORAL at 17:41

## 2017-10-01 RX ADMIN — Medication 10 ML: at 06:06

## 2017-10-01 RX ADMIN — Medication 10 ML: at 15:33

## 2017-10-01 RX ADMIN — LORAZEPAM 1 MG: 2 INJECTION INTRAMUSCULAR; INTRAVENOUS at 14:05

## 2017-10-01 RX ADMIN — Medication 10 ML: at 21:48

## 2017-10-01 RX ADMIN — MECLIZINE 25 MG: 12.5 TABLET ORAL at 00:24

## 2017-10-01 RX ADMIN — SODIUM CHLORIDE 75 ML/HR: 900 INJECTION, SOLUTION INTRAVENOUS at 08:36

## 2017-10-01 RX ADMIN — MECLIZINE 25 MG: 12.5 TABLET ORAL at 06:06

## 2017-10-01 RX ADMIN — MECLIZINE 25 MG: 12.5 TABLET ORAL at 12:43

## 2017-10-01 NOTE — PROGRESS NOTES
* No surgery found *  * No surgery found *  Bedside and Verbal shift change report given to Ace Schlatter, RN (oncoming nurse) by Zane Carrel, RN (offgoing nurse). Report included the following information SBAR, Kardex, Recent Results, Med Rec Status and Cardiac Rhythm SR. Zone Phone:   [phone]      Significant changes during shift:  Admission        Patient Information    Joellen Marrufo  74 y.o.  9/30/2017 12:11 PM by Patricia Leiva MD. Joellen Marrufo was admitted from Home    Problem List    Patient Active Problem List    Diagnosis Date Noted    CVA (cerebral vascular accident) ([address] 09/30/2017     Past Medical History:   Diagnosis Date    CAD (coronary artery disease)          Core Measures:    CVA: Yes Yes  CHF:No No  PNA:No No    Post Op Surgical (If Applicable):     Number times ambulated in hallway past shift:  None  Number of times OOB to chair past shift:   0  NG Tube: No  Incentive Spirometer: No  Drains: No   Volume  0  Dressing Present:  No  Flatus:  Not applicable    Activity Status:    OOB to Chair No  Ambulated this shift No   Bed Rest No    Supplemental O2: (If Applicable)    NC No  NRB No  Venti-mask No  On 0 Liters/min      LINES AND DRAINS:    Central Line? No   PICC LINE? No   Urinary Catheter? No   DVT prophylaxis:    DVT prophylaxis Med- yes   DVT prophylaxis SCD or KISHAN- No     Wounds: (If Applicable)    Wounds- No    Location 0    Patient Safety:    Falls Score Total Score: 3  Safety Level_______  Bed Alarm On? Yes  Sitter?  No, family at the bedside    Plan for upcoming shift: Neurochecks, Echo in AM, MRI in AM, Neurologist and primary MD consult (to be called in AM)        Discharge Plan: Yes when stable    Active Consults:  IP CONSULT TO NEUROLOGY  IP CONSULT TO PRIMARY CARE PROVIDER

## 2017-10-01 NOTE — PROGRESS NOTES
More MRI studies have been added, however MRI safety checklist from day old order is still not signed. I have put note in chart to request completed form. I have called the floor, spoke to Lizandro Victor, who took a message regarding need for MRI safety checklist completion. Patient may not be scanned again today if form is not done before schedule fills.

## 2017-10-01 NOTE — PROGRESS NOTES
* No surgery found *  * No surgery found *  Bedside and Verbal shift change report given to [address] (oncoming nurse) by Susan Mcbride RN (offgoing nurse). Report included the following information SBAR, Kardex, Recent Results, Med Rec Status and Cardiac Rhythm SR. Zone Phone:   [phone]      Significant changes during shift:  Admission        Patient Information    Mi Bermudez  74 y.o.  9/30/2017 12:11 PM by Nguyen Kinsey MD. Mi Bermudez was admitted from Home    Problem List    Patient Active Problem List    Diagnosis Date Noted    CVA (cerebral vascular accident) ([facility] [address] 09/30/2017     Past Medical History:   Diagnosis Date    CAD (coronary artery disease)          Core Measures:    CVA: Yes Yes  CHF:No No  PNA:No No    Post Op Surgical (If Applicable):     Number times ambulated in hallway past shift:  None  Number of times OOB to chair past shift:   0  NG Tube: No  Incentive Spirometer: No  Drains: No   Volume  0  Dressing Present:  No  Flatus:  Not applicable    Activity Status:    OOB to Chair No  Ambulated this shift No   Bed Rest No    Supplemental O2: (If Applicable)    NC No  NRB No  Venti-mask No  On 0 Liters/min      LINES AND DRAINS:    Central Line? No   PICC LINE? No   Urinary Catheter? No   DVT prophylaxis:    DVT prophylaxis Med- yes   DVT prophylaxis SCD or KISHAN- No     Wounds: (If Applicable)    Wounds- No    Location 0    Patient Safety:    Falls Score Total Score: 2  Safety Level_______  Bed Alarm On? Yes  Sitter?  No    Plan for upcoming shift: Neurochecks, Echo in AM, MRI in AM, Neurologist and primary MD consult        Discharge Plan: Yes when stable    Active Consults:  IP CONSULT TO NEUROLOGY  IP CONSULT TO PRIMARY CARE PROVIDER

## 2017-10-01 NOTE — PROGRESS NOTES
-MRI Safety Screening form has not been SIGNED. -MRI cannot be scheduled until MRI Safety Screening form is completed and reviewed, please provide signature.

## 2017-10-01 NOTE — PROGRESS NOTES
[facility] and Stroke Education provided to relative(s) and the following topics were discussed    1. Patients personal risk factors for stroke are hyperlipidemia    2. Warning signs of Stroke:        * Sudden numbness or weakness of the face, arm or leg, especially on one side of          The body            * Sudden confusion, trouble speaking or understanding        * Sudden trouble seeing in one or both eyes        * Sudden trouble walking, dizziness, loss of balance or coordination        * Sudden severe headache with no known cause      3. Importance of activation Emergency Medical Services ( 9-1-1 ) immediately if                       experience any warning signs of stroke. 4. Be sure and schedule a follow-up appointment with your primary care doctor or any                  specialists as instructed. 5. You must take medicine every day to treat your risk factors for stroke. Be sure to take your medicines exactly as your doctor tells you: no more, no less. Know what your medicines are for , what they do. Anti-thrombotics /anticoagulants can help prevent strokes. You are taking the following medicine(s)  Lovenox, aspirin     6. Smoking and second-hand smoke greatly increase your risk of stroke, cardiovascular disease and death.  Smoking history never

## 2017-10-01 NOTE — PROGRESS NOTES
Problem: Self Care Deficits Care Plan (Adult)  Goal: *Acute Goals and Plan of Care (Insert Text)  Occupational Therapy Goals  Initiated 10/1/2017   1. Patient will perform grooming standing at the sink with supervision/set-up within 7 day(s). 2. Patient will perform lower body dressing with supervision/set-up within 7 day(s). 3. Patient will perform toilet transfers with supervision/set-up within 7 day(s). 4. Patient will perform all aspects of toileting with supervision/set-up within 7 day(s). OCCUPATIONAL THERAPY EVALUATION  Patient: Dulce Paredes (77 y.o. female)  Date: 10/1/2017  Primary Diagnosis: CVA (cerebral vascular accident) [facility])  CVA (cerebral vascular accident) [facility])        Precautions:   Fall, Bed Alarm      ASSESSMENT :  Based on the objective data described below, the patient presents with good UE strength/ROM however is significantly limited by nausea, dizziness and diplopia (occurs predominantly in L visual field with horizontal, overlapping images). Limited evaluation completed today due to the above however noted good sitting balance once at EOB and ability to stand with CGA X 2. Requires significant encouragement to participate and appeared irritable throughout session. Noted mild nystagmus (if any) with position changes although difficult to assess given pt tendency to keep eyes closed. Currently requires total assist for LB dressing/toileting and maximal assist for bathing/MIN A for UB dressing however anticipate quick improvement once symptoms are under control given good UE strength/balance. Pt returned to supine position and left in bed. Deferred Lizzie Argue due to limited activity tolerance today. Imaging thus far as been negative; awaiting MRI to determine CVA vs BPPV/vertigo. .     Patient will benefit from skilled intervention to address the above impairments.   Patients rehabilitation potential is considered to be Good  Factors which may influence rehabilitation potential include:   [ ]                None noted  [ ]                Mental ability/status  [X]                Medical condition  [ ]                Home/family situation and support systems  [ ]                Safety awareness  [ ]                Pain tolerance/management  [ ]                Other:        PLAN :  Recommendations and Planned Interventions:  [X]                  Self Care Training                  [X]           Therapeutic Activities  [X]                  Functional Mobility Training    [ ]           Cognitive Retraining  [X]                  Therapeutic Exercises           [X]           Endurance Activities  [X]                  Balance Training                   [ ]           Neuromuscular Re-Education  [X]                  Visual/Perceptual Training     [X]      Home Safety Training  [X]                  Patient Education                 [X]           Family Training/Education  [ ]                  Other (comment):     Frequency/Duration: Patient will be followed by occupational therapy 3 times a week to address goals. Discharge Recommendations: Outpatient vestibular rehab vs TBD   Further Equipment Recommendations for Discharge: TBD       SUBJECTIVE:   Patient stated Anna Cook is the point of all this again? Zuleika Saavedra      OBJECTIVE DATA SUMMARY:   HISTORY:   Past Medical History:   Diagnosis Date    CAD (coronary artery disease)     History reviewed. No pertinent surgical history.      Prior Level of Function/Home Situation: IND with ADLs and ambulation  Expanded or extensive additional review of patient history: hx of CAD     Home Situation  Home Environment: Private residence  # Steps to Enter: 0  One/Two Story Residence: Other (Comment)  # of Interior Steps: 4  Interior Rails: Left  Lift Chair Available: No  Living Alone: No  Support Systems: Child(aramis)  Patient Expects to be Discharged to[de-identified] Private residence  Current DME Used/Available at Home: None  [X]  Right hand dominant             [ ]  Left hand dominant     EXAMINATION OF PERFORMANCE DEFICITS:  Cognitive/Behavioral Status:  Neurologic State: Alert  Orientation Level: Appropriate for age  Cognition: Appropriate decision making; Appropriate for age attention/concentration; Appropriate safety awareness; Follows commands  Perception: Appears intact  Perseveration: No perseveration noted  Safety/Judgement: Fall prevention     Skin: intact     Edema: None noted     Hearing: Auditory  Auditory Impairment: None     Vision/Perceptual:    Tracking: Able to track stimulus in all quadrants w/o difficulty (slighlt nystagmus noted R visual field only?)    Saccades:  (Unable to assess )    Convergence:  (Unable to assess)       Diplopia: Yes (consistant L visual field; images horizontal/overlapping)    Acuity: Within Defined Limits (Limited by diplopia/nausea)    Corrective Lenses: Glasses     Range of Motion:  AROM: Within functional limits  PROM: Within functional limits     Strength:  Strength: Within functional limits     Coordination:     Fine Motor Skills-Upper: Left Intact; Right Intact    Gross Motor Skills-Upper: Left Intact; Right Intact     Tone & Sensation:  Tone: Normal  Sensation: Intact     Balance:  Sitting: Intact; With support (BUE support)  Standing: Impaired  Standing - Static: Fair  Standing - Dynamic : Fair     Functional Mobility and Transfers for ADLs:  Bed Mobility:  Rolling: Supervision; Additional time  Supine to Sit: Stand-by asssistance; Additional time  Sit to Supine: Stand-by asssistance; Additional time  Scooting: Stand-by asssistance; Additional time     Transfers:  Sit to Stand: Contact guard assistance  Functional Transfers  Toilet Transfer : Contact guard assistance     ADL Assessment:  Feeding: Setup     Oral Facial Hygiene/Grooming: Setup     Bathing: Moderate assistance     Upper Body Dressing: Minimum assistance     Lower Body Dressing: Total assistance     Toileting:  Total assistance     ADL Intervention and task modifications: Lower Body Dressing Assistance  Socks: Total assistance (dependent) (pt unable to tolerate trunk flexion)  Position Performed: Seated edge of bed     Cognitive Retraining  Safety/Judgement: Fall prevention        Functional Measure:   Barthel Index:      Bathin  Bladder: 10  Bowels: 10  Groomin  Dressin  Feedin  Mobility: 0  Stairs: 0  Toilet Use: 5  Transfer (Bed to Chair and Back): 10  Total: 50         Barthel and G-code impairment scale:  Percentage of impairment CH  0% CI  1-19% CJ  20-39% CK  40-59% CL  60-79% CM  80-99% CN  100%   Barthel Score 0-100 100 99-80 79-60 59-40 20-39 1-19    0   Barthel Score 0-20 20 17-19 13-16 9-12 5-8 1-4 0      The Barthel ADL Index: Guidelines  1. The index should be used as a record of what a patient does, not as a record of what a patient could do. 2. The main aim is to establish degree of independence from any help, physical or verbal, however minor and for whatever reason. 3. The need for supervision renders the patient not independent. 4. A patient's performance should be established using the best available evidence. Asking the patient, friends/relatives and nurses are the usual sources, but direct observation and common sense are also important. However direct testing is not needed. 5. Usually the patient's performance over the preceding 24-48 hours is important, but occasionally longer periods will be relevant. 6. Middle categories imply that the patient supplies over 50 per cent of the effort. 7. Use of aids to be independent is allowed. Lopez Taylor., Barthel, D.W. (2830). Functional evaluation: the Barthel Index. [facility] (14)2. Carlos Catalan sonja ADELA Perla, Shauna Padgett, Mati Jensen., [place], [address] [place] [address] (). Measuring the change indisability after inpatient rehabilitation; comparison of the responsiveness of the Barthel Index and Functional [place] Measure. Journal of Neurology, Neurosurgery, and Psychiatry, 66(4), 476-184.   Carlos Catalan Exel, N.J.A, Pj Ennis M.A. (2004.) Assessment of post-stroke quality of life in cost-effectiveness studies: The usefulness of the Barthel Index and the EuroQoL-5D. Quality of Life Research, 13, 970-68           G codes: In compliance with CMSs Claims Based Outcome Reporting, the following G-code set was chosen for this patient based on their primary functional limitation being treated: The outcome measure chosen to determine the severity of the functional limitation was the Barthel Index with a score of 50/100 which was correlated with the impairment scale. · Self Care:               - CURRENT STATUS:    CK - 40%-59% impaired, limited or restricted               - GOAL STATUS:           CI - 1%-19% impaired, limited or restricted               - D/C STATUS:                       ---------------To be determined---------------       Occupational Therapy Evaluation Charge Determination   History Examination Decision-Making   LOW Complexity : Brief history review  LOW Complexity : 1-3 performance deficits relating to physical, cognitive , or psychosocial skils that result in activity limitations and / or participation restrictions  LOW Complexity : No comorbidities that affect functional and no verbal or physical assistance needed to complete eval tasks       Based on the above components, the patient evaluation is determined to be of the following complexity level: LOW      Pain:  Pain Scale 1: Numeric (0 - 10)  Pain Intensity 1: 0              Activity Tolerance:   Limited due to nausea/dizziness/diplopia. No vomiting noted during evaluation. Please refer to the flowsheet for vital signs taken during this treatment.   After treatment:   [ ]  Patient left in no apparent distress sitting up in chair  [X]  Patient left in no apparent distress in bed  [X]  Call bell left within reach  [X]  Nursing notified  [X]  Caregiver present  [ ]  Bed alarm activated COMMUNICATION/EDUCATION:   The patients plan of care was discussed with: Physical Therapist and Registered Nurse. [ ]      Home safety education was provided and the patient/caregiver indicated understanding. [X]      Patient/family have participated as able and agree with findings and recommendations. [ ]      Patient is unable to participate in plan of care at this time. This patients plan of care is appropriate for delegation to [facility].      Thank you for this referral.  Tony Amin, OT  Time Calculation: 20 mins

## 2017-10-01 NOTE — PROGRESS NOTES
[address] [place] [address]  [phone]      Medical Progress Note      NAME: Carlie Morales   :  1950  MRM:  [id number]    Date/Time: 10/1/2017  9:19 AM      Subjective:     Events leading to admission noted. No recent N/V but still gets dizzy with turning head to side. Seems a bit lethargic possibly related to meclizine. Neuro W/U in progress. Past Medical History reviewed and unchanged from Admission History and Physical and/or prior progress note/electronic medical record    Medications reviewed. ROS:      General: negative for fever, chills, sweats, weakness  Eyes: negative for blurred vision, eye pain, loss of vision, diplopia  Ear Nose and Throat: negative for rhinorrhea, pharyngitis, otalgia, tinnitus, speech or swallowing difficulties  Respiratory:  negative for cough, sputum production, SOB, wheezing, LÓPEZ, pleuritic pain  Cardiology:  negative for chest pain, palpitations, orthopnea, PND, edema, syncope   Gastrointestinal: negative for abdominal pain, N/V, dysphagia, change in bowel habits, bleeding  Genitourinary: negative for frequency, urgency, dysuria, hematuria, incontinence  Neurological: positive for dizziness    Objective:     Last 24hrs VS reviewed since prior progress note.  Most recent are:    Visit Vitals    /64 (BP 1 Location: Right arm, BP Patient Position: At rest)    Pulse 66    Temp 98 °F (36.7 °C)    Resp 16    Ht 5' 7\" (1.702 m)    Wt 130 lb (59 kg)    SpO2 97%    Breastfeeding No    BMI 20.36 kg/m2     SpO2 Readings from Last 6 Encounters:   10/01/17 97%   11 99%          Intake/Output Summary (Last 24 hours) at 10/01/17 0919  Last data filed at 10/01/17 0606   Gross per 24 hour   Intake           836.25 ml   Output              350 ml   Net           486.25 ml          Physical Exam:    General appearance: alert, cooperative, no distress, appears stated age  Eyes: negative  Neck: supple, symmetrical, trachea midline, no adenopathy, thyroid: not enlarged, symmetric, no tenderness/mass/nodules, no carotid bruit and no JVD  Lungs: clear to auscultation bilaterally  Heart: regular rate and rhythm, S1, S2 normal, no murmur, click, rub or gallop  Abdomen: soft, non-tender. Bowel sounds normal. No masses,  no organomegaly  Extremities: extremities normal, atraumatic, no cyanosis or edema  Skin: Skin color, texture, turgor normal. No rashes or lesions  Neurologic: Grossly normal, no nystagmus, focal weakness    Data Review:    Lab:    BMP:   Lab Results   Component Value Date/Time     09/30/2017 02:33 PM    K 3.6 09/30/2017 02:33 PM     09/30/2017 02:33 PM    CO2 26 09/30/2017 02:33 PM    AGAP 6 09/30/2017 02:33 PM     (H) 09/30/2017 02:33 PM    BUN 18 09/30/2017 02:33 PM    CREA 0.72 09/30/2017 02:33 PM    GFRAA >60 09/30/2017 02:33 PM    GFRNA >60 09/30/2017 02:33 PM     CBC:   Lab Results   Component Value Date/Time    WBC 9.5 09/30/2017 02:33 PM    HGB 12.5 09/30/2017 02:33 PM    HCT 36.9 09/30/2017 02:33 PM     09/30/2017 02:33 PM     All labs reviewed in past 24 hours    Other pertinent lab: none    Imaging:    Reviewed. CT      Assessment / Plan: Active Problems:    CVA (cerebral vascular accident) ([facility] (9/30/2017)      Dizziness (10/1/2017)      Nausea & vomiting (10/1/2017)      Hyperlipidemia (10/1/2017)      ASCVD (arteriosclerotic cardiovascular disease) (10/1/2017)        1. MRI, Echo  2. Continue present Rx  3.  Mobilize when able             Care Plan discussed with: Patient    Discussed:  Care Plan    Prophylaxis:  Lovenox    Disposition:  Home w/Family  ___________________________________________________    Attending Physician: Yumiko Nuñez MD

## 2017-10-01 NOTE — CONSULTS
DATE OF CONSULTATION: 10/1/2017    CONSULTED BY: Nguyen Kinsey MD    Chief Complaint   Patient presents with    Dizziness       Reason for Consult  I have been asked to see the patient in neurological consultation to render advice and opinion regarding acute onset vertigo    HISTORY OF PRESENT ILLNESS  Mi Bermudez is a 74 y.o. female who presents to the hospital because of acute onset vertigo. She was driving yesterday when dhe had visual difficulties, \"it felt like the road waws warped\" Symptoms worsened with vertigo, nausea and vomiting and she went to ER with daughter. She denies weakness of arms or legs, no numbness or tingling, no swallowing difficulties or slurred speech   PMH significant for CAD.     ROS  A ten system review of constitutional, cardiovascular, respiratory, musculoskeletal, endocrine, skin, SHEENT, genitourinary, psychiatric and neurologic systems was obtained and is unremarkable except as stated in HPI     PMH  Past Medical History:   Diagnosis Date    CAD (coronary artery disease)        FH  Family History   Problem Relation Age of Onset    No Known Problems Mother     No Known Problems Father     Sudden Death Other        [address] [place] [address]  Social History     Social History    Marital status:      Spouse name: N/A    Number of children: N/A    Years of education: N/A     Social History Main Topics    Smoking status: Never Smoker    Smokeless tobacco: None    Alcohol use No    Drug use: No    Sexual activity: No     Other Topics Concern    None     Social History Narrative     Patient Active Problem List   Diagnosis Code    CVA (cerebral vascular accident) ([facility] I63.9    Dizziness R42    Nausea & vomiting R11.2    Hyperlipidemia E78.5    ASCVD (arteriosclerotic cardiovascular disease) I25.10         ALLERGIES  No Known Allergies    PHYSICAL EXAM  EXAMINATION:   Patient Vitals for the past 24 hrs:   Temp Pulse Resp BP SpO2   10/01/17 0802 98 °F (36.7 °C) 66 16 126/64 97 % 10/01/17 0345 97.9 °F (36.6 °C) 69 16 123/56 97 %   09/30/17 2217 98 °F (36.7 °C) 71 16 148/63 100 %   09/30/17 2000 - (!) 59 17 132/66 -   09/30/17 1809 - 67 16 - 97 %   09/30/17 1800 - 63 15 145/62 96 %   09/30/17 1630 - 63 11 156/67 98 %   09/30/17 1600 - 61 15 139/71 99 %   09/30/17 1536 - 62 9 - 100 %   09/30/17 1530 - 62 13 151/75 99 %   09/30/17 1430 - (!) 59 14 - 99 %   09/30/17 1400 - 60 13 152/69 -   09/30/17 1330 - 63 11 162/67 -   09/30/17 1303 - (!) 59 12 - 98 %   09/30/17 1300 - (!) 59 9 159/78 98 %   09/30/17 1222 98 °F (36.7 °C) 63 13 165/78 98 %        General:   Physical Exam   CONSTITUTIONAL: Oriented to person, place, and time, appears well-developed and well-nourished. No distress. Head: Normocephalic and atraumatic. ENT: Oropharynx is clear and moist. No oropharyngeal exudate. EYES: Conjunctivae and EOM are normal. Pupils are equal, round, and reactive to light. Fundoscopic exam normal, No scleral icterus. NECK: Normal range of motion. Neck supple. No thyromegaly present. Carotids w/o bruit  CARDIOVASCULARr: Normal rate and regular rhythm. No murmur heard. RESPIRATORY: Effort normal and breath sounds normal.   LYMPH:   No Cervical or Cranial adenopathy. SKIN:  No significant bruising or lacerations. Neurological Examination:   Mental Status:  AAO x3. Speech is fluent. Follows commands, has normal attention    Cranial Nerves: Visual fields are full. PERRL, Extraocular movements are full. She has resting horizontal nystagmus, worse looking to left. Facial sensation intact V1- V3. Facial movement intact, symmetric. Hearing intact to conversation. Palate elevates symmetrically. Shoulder shrug symmetric. Tongue midline. Motor: Strength is 5/5 in all 4 ext. Normal tone. No atrophy. Sensation: Normal to light touch    Reflexes: DTRs 2+ throughout. Plantar responses downgoing.      Gait:NT       Imaging review:    CT HEAD 9/30/17    EXAM:  CT HEAD WO CONT     INDICATION:   Severe vertigo. Sudden onset of dizziness, nausea, and vomiting  today while driving.     COMPARISON: None.     CONTRAST:  None.     TECHNIQUE: Unenhanced CT of the head was performed using 5 mm images. Brain and  bone windows were generated. CT dose reduction was achieved through use of a  standardized protocol tailored for this examination and automatic exposure  control for dose modulation.       FINDINGS:  The ventricles and sulci are normal in size, shape and configuration and  midline. There is no significant white matter disease. There is no intracranial  hemorrhage, extra-axial collection, mass, mass effect or midline shift. The  basilar cisterns are open. No acute infarct is identified. The bone windows  demonstrate no abnormalities. The visualized portions of the paranasal sinuses  and mastoid air cells are clear.     IMPRESSION  IMPRESSION: No acute intracranial process seen    HOME MEDS  Prior to Admission Medications   Prescriptions Last Dose Informant Patient Reported? Taking?   atorvastatin (LIPITOR) 20 mg tablet   Yes Yes   Sig: Take  by mouth daily. Facility-Administered Medications: None       CURRENT MEDS  Current Facility-Administered Medications   Medication Dose Route Frequency    atorvastatin (LIPITOR) tablet 20 mg  20 mg Oral QHS    sodium chloride (NS) flush 5-10 mL  5-10 mL IntraVENous Q8H    0.9% sodium chloride infusion  75 mL/hr IntraVENous CONTINUOUS    aspirin chewable tablet 81 mg  81 mg Oral DAILY    enoxaparin (LOVENOX) injection 30 mg  30 mg SubCUTAneous Q24H    meclizine (ANTIVERT) tablet 25 mg  25 mg Oral Q6H       IMPRESSION:RECOMMENDATIONS:  Bee Schwartz is a 74 y.o. female who presents with vertigo. I suspect this is just severe vestibular neuronitis however she is 67 and symptoms could be produced by cerebellar stroke. Will await MRI/MRA, if negative will ask PT for Epley manuvers. Continue 81 mg ASA. Thank you very much for this consultation.  We will follow up on the above studies and give further recommendations as indicated. Rakel Lombardi. Jose Alatorre MD  Neurologist    This note will not be viewable in [address]

## 2017-10-01 NOTE — PROGRESS NOTES
No surgery found *  * No surgery found *  Bedside and Verbal shift change report given to Catalina Norris RN (oncoming nurse) by Jama Bianchi RN (offgoing nurse). Report included the following information SBAR, Kardex, Recent Results, Med Rec Status and Cardiac Rhythm SR.     Zone Phone:   [phone]        Significant changes during shift:  none           Patient Information     Natalie Cook  74 y.o.  9/30/2017 12:11 PM by Zoya Del Castillo MD. Natalie Cook was admitted from Home     Problem List          Patient Active Problem List     Diagnosis Date Noted    CVA (cerebral vascular accident) ([facility] 09/30/2017           Past Medical History:   Diagnosis Date    CAD (coronary artery disease)              Core Measures:     CVA: Yes Yes  CHF:No No  PNA:No No     Post Op Surgical (If Applicable):      Number times ambulated in hallway past shift:  None  Number of times OOB to chair past shift:   0  NG Tube: No  Incentive Spirometer: No  Drains: No   Volume  0  Dressing Present:  No  Flatus:  Not applicable     Activity Status:     OOB to Chair No  Ambulated this shift No   Bed Rest No     Supplemental O2: (If Applicable)     NC No  NRB No  Venti-mask No  On 0 Liters/min        LINES AND DRAINS:     Central Line? No   PICC LINE? No   Urinary Catheter? No   DVT prophylaxis:     DVT prophylaxis Med- yes   DVT prophylaxis SCD or KISHAN- No      Wounds: (If Applicable)     Wounds- No     Location 0     Patient Safety:     Falls Score Total Score: 3  Safety Level_______  Bed Alarm On? Yes  Sitter?  No, family at the bedside     Plan for upcoming shift: Neurochecks, Echo in AM,             Discharge Plan: Yes when stable     Active Consults:  IP CONSULT TO NEUROLOGY  IP CONSULT TO PRIMARY CARE PROVIDER

## 2017-10-01 NOTE — PROGRESS NOTES
TRANSFER - IN REPORT:    Verbal report received from Oscar Robles RN(name) on Mireille Nik  being received from ER(unit) for routine progression of care      Report consisted of patients Situation, Background, Assessment and   Recommendations(SBAR). Information from the following report(s) SBAR, Kardex, Recent Results, Med Rec Status and Cardiac Rhythm SR was reviewed with the receiving nurse. Opportunity for questions and clarification was provided. Assessment completed upon patients arrival to unit and care assumed.

## 2017-10-01 NOTE — PROGRESS NOTES
Problem: Mobility Impaired (Adult and Pediatric)  Goal: *Acute Goals and Plan of Care (Insert Text)  Physical Therapy Goals  Initiated 10/1/2017  1. Patient will move from supine to sit and sit to supine , scoot up and down and roll side to side in bed with independence within 7 day(s). 2. Patient will transfer from bed to chair and chair to bed with independence using the least restrictive device within 7 day(s). 3. Patient will perform sit to stand with independence within 7 day(s). 4. Patient will ambulate with independence for 500 feet with the least restrictive device within 7 day(s). 5. Patient will ascend/descend 4 stairs with handrail(s) with independence within 7 day(s). PHYSICAL THERAPY EVALUATION  Patient: Pavel Romero (86 y.o. female)  Date: 10/1/2017  Primary Diagnosis: CVA (cerebral vascular accident) [facility])  CVA (cerebral vascular accident) [facility])        Precautions:   Fall, Bed Alarm      ASSESSMENT :  Based on the objective data described below, the patient presents with ROM/strength [facility], inability to assess gait today due to pt with c/o pain/pressure in posterior head, nausea, and intermittent double vision today at rest and with bed mob. And transfer efforts, symptoms the same, no worse, per pt report at rest and with functional mobility efforts. Pt keeping eyes closed intermittently throughout session, asking therapist to hurry to completion as she wanted to lie down quickly after sitting and standing today. Nurse notified. Pt educated on allowing for [facility] to be raised gradually throughout the day to assist with symptom control during positional changes. NO nystagmus observed with positional changes today when pt with eyes open and could be assessed for this. ? Vertigo, ? BPPV as pt's symptoms are not constant/consistent vs. r/o CVA as pt is to have MRI later today, head CT negative. Will continue to follow for goals above and ?  Maneuvers for tx of vertigo as pt allows for and as appropriate for symptoms. Patient will benefit from skilled intervention to address the above impairments. Patients rehabilitation potential is considered to be Good  Factors which may influence rehabilitation potential include:   [X]         None noted  [ ]         Mental ability/status  [ ]         Medical condition  [ ]         Home/family situation and support systems  [ ]         Safety awareness  [ ]         Pain tolerance/management  [ ]         Other:        PLAN :  Recommendations and Planned Interventions:  [X]           Bed Mobility Training             [ ]    Neuromuscular Re-Education  [X]           Transfer Training                   [ ]    Orthotic/Prosthetic Training  [X]           Gait Training                         [ ]    Modalities  [X]           Therapeutic Exercises           [ ]    Edema Management/Control  [X]           Therapeutic Activities            [X]    Patient and Family Training/Education  [ ]           Other (comment):     Frequency/Duration: Patient will be followed by physical therapy  5 times a week to address goals. Discharge Recommendations: Outpatient  Further Equipment Recommendations for Discharge: tbd (await gait assessment)       SUBJECTIVE:   Patient stated Just hurry up and do what needs to be done so I can lie back down.       OBJECTIVE DATA SUMMARY:   HISTORY:    Past Medical History:   Diagnosis Date    CAD (coronary artery disease)     History reviewed. No pertinent surgical history.   Prior Level of Function/Home Situation: I with all PTA, no AD use  Personal factors and/or comorbidities impacting plan of care:      Home Situation  Home Environment: Private residence  # Steps to Enter: 0  One/Two Story Residence: Other (Comment)  # of Interior Steps: 4  Interior Rails: Left  Lift Chair Available: No  Living Alone: No  Support Systems: Child(aramis)  Patient Expects to be Discharged to[de-identified] Private residence  Current DME Used/Available at Home: None EXAMINATION/PRESENTATION/DECISION MAKING:   Critical Behavior:  Neurologic State: Alert  Orientation Level: Oriented X4  Cognition: Appropriate for age attention/concentration     Hearing: Auditory  Auditory Impairment: None  Skin:  Intact  Edema: NA  Range Of Motion:  AROM: Within functional limits           PROM: Within functional limits           Strength:    Strength: Within functional limits                    Tone & Sensation:   Tone: Normal              Sensation: Intact           Vision:    Pt with double vision intermittently today  Functional Mobility:  Bed Mobility:  Rolling: Supervision; Additional time  Supine to Sit: Stand-by asssistance; Additional time  Sit to Supine: Stand-by asssistance; Additional time  Scooting: Stand-by asssistance; Additional time  Transfers:  Sit to Stand: Contact guard assistance  Stand to Sit: Contact guard assistance                       Balance:   Sitting: Intact; With support (BUE support)  Standing: Impaired  Standing - Static: Fair  Standing - Dynamic : Fair  Ambulation/Gait Training:              Gait Description (WDL):  (pt declines)              Functional Measure:  Tinetti test:      Sitting Balance: 1  Arises: 1  Attempts to Rise: 2  Immediate Standing Balance: 0  Standing Balance: 0  Nudged: 0  Eyes Closed: 0  Turn 360 Degrees - Continuous/Discontinuous: 0  Turn 360 Degrees - Steady/Unsteady: 0  Sitting Down: 1  Balance Score: 5  Indication of Gait: 0  R Step Length/Height: 0  L Step Length/Height: 0  R Foot Clearance: 0  L Foot Clearance: 0  Step Symmetry: 0  Step Continuity: 0  Path: 0  Trunk: 0  Walking Time: 0  Gait Score: 0  Total Score: 5         Tinetti Test and G-code impairment scale:  Percentage of Impairment CH     0%    CI     1-19% CJ     20-39% CK     40-59% CL     60-79% CM     80-99% CN      100%   Tinetti  Score 0-28 28 23-27 17-22 12-16 6-11 1-5 0          Tinetti Tool Score Risk of Falls  <19 = High Fall Risk  19-24 = Moderate Fall Risk  25-28 = Low Fall Risk  Tinetti ME. Performance-Oriented Assessment of Mobility Problems in Elderly Patients. [facility] 66; [id number]. (Scoring Description: PT Bulletin Feb. 10, 1993)     Older adults: Gerald Mejia et al, 2009; n = 1000 [facility] elderly evaluated with ABC, TRACEE, ADL, and IADL)  · Mean TRACEE score for males aged 69-68 years = 26.21(3.40)  · Mean TRACEE score for females age 69-68 years = 25.16(4.30)  · Mean TRACEE score for males over 80 years = 23.29(6.02)  · Mean TRACEE score for females over 80 years = 17.20(8.32)         G codes: In compliance with CMSs Claims Based Outcome Reporting, the following G-code set was chosen for this patient based on their primary functional limitation being treated: The outcome measure chosen to determine the severity of the functional limitation was the Tinetti with a score of 5/28 which was correlated with the impairment scale.       · Mobility - Walking and Moving Around:               - CURRENT STATUS:    CM - 80%-99% impaired, limited or restricted               - GOAL STATUS:           CJ - 20%-39% impaired, limited or restricted               - D/C STATUS:                       ---------------To be determined---------------      Physical Therapy Evaluation Charge Determination   History Examination Presentation Decision-Making   MEDIUM  Complexity : 1-2 comorbidities / personal factors will impact the outcome/ POC  MEDIUM Complexity : 3 Standardized tests and measures addressing body structure, function, activity limitation and / or participation in recreation  MEDIUM Complexity : Evolving with changing characteristics  Other outcome measures Tinetti  MEDIUM      Based on the above components, the patient evaluation is determined to be of the following complexity level: MEDIUM     Pain:  Pain Scale 1: Numeric (0 - 10)  Pain Intensity 1: 0              Activity Tolerance:   Poor due to pressure in head, nausea, and double vision:  Please refer to the flowsheet for vital signs taken during this treatment. After treatment:   [ ]         Patient left in no apparent distress sitting up in chair  [X]         Patient left in no apparent distress in bed  [X]         Call bell left within reach  [X]         Nursing notified  [X]         Caregiver present  [X]         Bed alarm activated      COMMUNICATION/EDUCATION:   The patients plan of care was discussed with: Occupational Therapist and Registered Nurse.  [X]         Fall prevention education was provided and the patient/caregiver indicated understanding. [X]         Patient/family have participated as able in goal setting and plan of care. [ ]         Patient/family agree to work toward stated goals and plan of care. [ ]         Patient understands intent and goals of therapy, but is neutral about his/her participation. [ ]         Patient is unable to participate in goal setting and plan of care.      Thank you for this referral.  Carmen Dickinson, PT, DPT   Time Calculation: 22 mins

## 2017-10-01 NOTE — PROGRESS NOTES
Problem: TIA/CVA Stroke: 0-24 hours  Goal: Diagnostic Test/Procedures  Outcome: Progressing Towards Goal  Echo and MRI ordered. HBG A1C scheduled. Goal: Discharge Planning  Outcome: Progressing Towards Goal  When testing complete and patient stable. Goal: *Dysphagia screen performed(Stroke Metric)  Outcome: Progressing Towards Goal  Complete in ED.

## 2017-10-02 LAB
ANION GAP SERPL CALC-SCNC: 7 MMOL/L (ref 5–15)
BUN SERPL-MCNC: 15 MG/DL (ref 6–20)
BUN/CREAT SERPL: 21 (ref 12–20)
CALCIUM SERPL-MCNC: 8.4 MG/DL (ref 8.5–10.1)
CHLORIDE SERPL-SCNC: 106 MMOL/L (ref 97–108)
CO2 SERPL-SCNC: 24 MMOL/L (ref 21–32)
CREAT SERPL-MCNC: 0.71 MG/DL (ref 0.55–1.02)
GLUCOSE SERPL-MCNC: 92 MG/DL (ref 65–100)
POTASSIUM SERPL-SCNC: 3.9 MMOL/L (ref 3.5–5.1)
SODIUM SERPL-SCNC: 137 MMOL/L (ref 136–145)

## 2017-10-02 PROCEDURE — 36415 COLL VENOUS BLD VENIPUNCTURE: CPT | Performed by: INTERNAL MEDICINE

## 2017-10-02 PROCEDURE — 74011250637 HC RX REV CODE- 250/637: Performed by: INTERNAL MEDICINE

## 2017-10-02 PROCEDURE — 80048 BASIC METABOLIC PNL TOTAL CA: CPT | Performed by: INTERNAL MEDICINE

## 2017-10-02 PROCEDURE — 93306 TTE W/DOPPLER COMPLETE: CPT

## 2017-10-02 PROCEDURE — 65660000000 HC RM CCU STEPDOWN

## 2017-10-02 PROCEDURE — 74011250636 HC RX REV CODE- 250/636: Performed by: INTERNAL MEDICINE

## 2017-10-02 RX ORDER — PANTOPRAZOLE SODIUM 40 MG/1
40 TABLET, DELAYED RELEASE ORAL
Status: DISCONTINUED | OUTPATIENT
Start: 2017-10-02 | End: 2017-10-03

## 2017-10-02 RX ADMIN — ATORVASTATIN CALCIUM 20 MG: 20 TABLET, FILM COATED ORAL at 21:49

## 2017-10-02 RX ADMIN — MECLIZINE 25 MG: 12.5 TABLET ORAL at 23:07

## 2017-10-02 RX ADMIN — ASPIRIN 81 MG 81 MG: 81 TABLET ORAL at 09:18

## 2017-10-02 RX ADMIN — MECLIZINE 25 MG: 12.5 TABLET ORAL at 12:24

## 2017-10-02 RX ADMIN — Medication 10 ML: at 05:45

## 2017-10-02 RX ADMIN — Medication 10 ML: at 21:49

## 2017-10-02 RX ADMIN — MECLIZINE 25 MG: 12.5 TABLET ORAL at 18:16

## 2017-10-02 RX ADMIN — Medication 10 ML: at 15:34

## 2017-10-02 RX ADMIN — MECLIZINE 25 MG: 12.5 TABLET ORAL at 00:15

## 2017-10-02 RX ADMIN — MECLIZINE 25 MG: 12.5 TABLET ORAL at 05:45

## 2017-10-02 NOTE — PROGRESS NOTES
[address] [place] [address]  [phone]      Medical Progress Note      NAME: Mignon Saldaña   :  1950  MRM:  [id number]    Date/Time: 10/2/2017  6:08 AM      Subjective:     Alert, oriented and AVSS. Continues to have positional vertigo symptoms with position change without vomiting. No headache or neuro deficits. Past Medical History reviewed and unchanged from Admission History and Physical and/or prior progress note/electronic medical record    Medications reviewed. ROS:      General: negative for fever, chills, sweats, weakness  Eyes: negative for blurred vision, eye pain, loss of vision, diplopia  Ear Nose and Throat: negative for rhinorrhea, pharyngitis, otalgia, tinnitus, speech or swallowing difficulties  Respiratory:  negative for cough, sputum production, SOB, wheezing, LÓPEZ, pleuritic pain  Cardiology:  negative for chest pain, palpitations, orthopnea, PND, edema, syncope   Gastrointestinal: negative for abdominal pain, N/V, dysphagia, change in bowel habits, bleeding  Genitourinary: negative for frequency, urgency, dysuria, hematuria, incontinence  Neurological: positive for dizziness    Objective:     Last 24hrs VS reviewed since prior progress note.  Most recent are:    Visit Vitals    /72    Pulse (!) 58    Temp 98.1 °F (36.7 °C)    Resp 16    Ht 5' 7\" (1.702 m)    Wt 130 lb (59 kg)    SpO2 97%    Breastfeeding No    BMI 20.36 kg/m2     SpO2 Readings from Last 6 Encounters:   10/02/17 97%   11 99%            Intake/Output Summary (Last 24 hours) at 10/02/17 0608  Last data filed at 10/02/17 0356   Gross per 24 hour   Intake              120 ml   Output             1500 ml   Net            -1380 ml          Physical Exam:    General appearance: alert, cooperative, no distress, appears stated age  Eyes: negative  Neck: supple, symmetrical, trachea midline, no adenopathy, thyroid: not enlarged, symmetric, no tenderness/mass/nodules, no carotid bruit and no JVD  Lungs: clear to auscultation bilaterally  Heart: regular rate and rhythm, S1, S2 normal, no murmur, click, rub or gallop  Abdomen: soft, non-tender. Bowel sounds normal. No masses,  no organomegaly  Extremities: extremities normal, atraumatic, no cyanosis or edema  Skin: Skin color, texture, turgor normal. No rashes or lesions  Neurologic: Grossly normal, no nystagmus, focal weakness    Data Review:    Lab:    BMP:   Lab Results   Component Value Date/Time     10/02/2017 04:17 AM    K 3.9 10/02/2017 04:17 AM     10/02/2017 04:17 AM    CO2 24 10/02/2017 04:17 AM    AGAP 7 10/02/2017 04:17 AM    GLU 92 10/02/2017 04:17 AM    BUN 15 10/02/2017 04:17 AM    CREA 0.71 10/02/2017 04:17 AM    GFRAA >60 10/02/2017 04:17 AM    GFRNA >60 10/02/2017 04:17 AM     CBC:   No results found for: WBC, HGB, HGBEXT, HCT, HCTEXT, PLT, PLTEXT, HGBEXT, HCTEXT, PLTEXT  All labs reviewed in past 24 hours    Other pertinent lab: none    Imaging:  CT head - No acute intracranial process seen    CTA head - No major vessel occlusion, aneurysm, dissection, intraluminal  thrombus, or significant stenosis    MRI brain/neck -  No significant significant stenosis in the neck. ACAs not differentiated on MRA of the COW    MRI brain - Normal study for age    Reviewed. Assessment / Plan: Active Problems:    CVA (cerebral vascular accident) ([address] (9/30/2017)      Dizziness (10/1/2017)      Nausea & vomiting (10/1/2017)      Hyperlipidemia (10/1/2017)      ASCVD (arteriosclerotic cardiovascular disease) (10/1/2017)        1. Continue meclizine  2. Continue IV  3. Continue PT/OT to mobilize  4.   Neurology following           Care Plan discussed with: Patient    Discussed:  Care Plan    Prophylaxis:  Lovenox    Disposition:  Home w/Family  ___________________________________________________    Attending Physician: Vale Ma MD

## 2017-10-02 NOTE — CONSULTS
PROGRESS NOTE    Chief Complaint   Patient presents with    Dizziness           HISTORY OF PRESENT ILLNESS  Sita Ohara is a 74 y.o. female who presents to the hospital because of acute onset vertigo. She was driving yesterday when dhe had visual difficulties, \"it felt like the road waws warped\" Symptoms worsened with vertigo, nausea and vomiting and she went to ER with daughter. She denies weakness of arms or legs, no numbness or tingling, no swallowing difficulties or slurred speech  She is better today but not back to normal. MRI Brain and MRA Head and Neck normal c/w Acute Vestibular Neuronitis  No vomiting, still c/o fatigue.       PMH  Past Medical History:   Diagnosis Date    CAD (coronary artery disease)        FH  Family History   Problem Relation Age of Onset    No Known Problems Mother     No Known Problems Father     Sudden Death Other        SH  Social History     Social History    Marital status:      Spouse name: N/A    Number of children: N/A    Years of education: N/A     Social History Main Topics    Smoking status: Never Smoker    Smokeless tobacco: None    Alcohol use No    Drug use: No    Sexual activity: No     Other Topics Concern    None     Social History Narrative     Patient Active Problem List   Diagnosis Code    CVA [facility]cerebral vascular [facility]) [facility] [address] [facility] I63.9    Dizziness R42    Nausea & vomiting R11.2    Hyperlipidemia E78.5    ASCVD (arteriosclerotic cardiovascular disease) I25.10         ALLERGIES  No Known Allergies    PHYSICAL EXAM  EXAMINATION:   Patient Vitals for the past 24 hrs:   Temp Pulse Resp BP SpO2   10/02/17 0800 97.8 °F (36.6 °C) (!) 52 16 148/75 99 %   10/02/17 0412 98.1 °F (36.7 °C) (!) 58 16 137/72 97 %   10/01/17 2348 98 °F (36.7 °C) 60 16 157/67 96 %   10/01/17 1955 98 °F (36.7 °C) 60 16 155/74 97 %   10/01/17 1555 98.3 °F (36.8 °C) 63 16 148/68 96 %   10/01/17 1125 98.4 °F (36.9 °C) 66 17 140/76 98 %          Neurological Examination: Mental Status:  AAO x3. Speech is fluent. Follows commands, has normal attention    Cranial Nerves: Visual fields are full. PERRL, Extraocular movements are full. No resting Nystagmus   Facial sensation intact V1- V3. Facial movement intact, symmetric. Hearing intact to conversation. Palate elevates symmetrically. Shoulder shrug symmetric. Tongue midline. Motor: Strength is 5/5 in all 4 ext. Sensation: Normal     Reflexes: DTRs 2+ throughout. Plantar responses downgoing. Gait:NT       Imaging review:      MRI BRAIN 10/1/17        EXAM:  MRI BRAIN WO CONT     INDICATION:  Severe vertigo     COMPARISON: None.     CONTRAST:  None.     TECHNIQUE: Sagittal T1, axial FLAIR, T2,T1 and gradient echo images as well as  coronal T2 weighted images and axial diffusion weighted images of the head were  obtained.     FINDINGS: The ventricular size and configuration are normal. There are rare oval  foci of FLAIR hyperintensity in the periventricular and deep white matter of the  frontal lobes. There is no evidence of mass, hemorrhage, acute infarct or  abnormal extra-axial fluid collection. Normal appearing flow-voids are present  in the vertebral, basilar and carotid artery systems. The craniocervical  junction is normal. The structures at the cranial base including paranasal  sinuses and mastoid air cells are unremarkable.     IMPRESSION  IMPRESSION: Normal study for age       Imaging      MRA NECK 10/1/17    HISTORY:  Vertigo     COMPARISON:  None     TECHNIQUE: 2-D time-of-flight MRA of the neck was performed. There is motion  artifact. Multiplanar reconstructions were obtained.     FINDINGS:     Given motion artifact there is no flow-limiting stenosis of the distal common  carotids, carotid bifurcation or proximal internal carotid arteries. Visualized  vertebral arteries are codominant.     IMPRESSION  IMPRESSION:  1.   Study limited by motion artifact but no flow-limiting stenosis demonstrated.     MRA BRAIN 10/1/17    INDICATION:   severe vertigo     COMPARISON:  None     TECHNIQUE:    3-D time-of-flight MRA of the brain was performed. Multiplanar reconstructions  were obtained.     FINDINGS:  The vertebral arteries are codominant. The basilar artery and its branches are  normal. There is loss of flow-void within the cavernous carotids bilaterally  favored to be technically related. The middle cerebral arteries are patent. Anterior cerebral arteries are patent. . There is no flow-limiting intracranial  stenosis. There is no aneurysm. There are no sizable posterior communicating  arteries.     IMPRESSION  IMPRESSION:       No flow-limiting stenosis. HOME MEDS  Prior to Admission Medications   Prescriptions Last Dose Informant Patient Reported? Taking?   atorvastatin (LIPITOR) 20 mg tablet   Yes Yes   Sig: Take  by mouth daily. Facility-Administered Medications: None       CURRENT MEDS  Current Facility-Administered Medications   Medication Dose Route Frequency    atorvastatin (LIPITOR) tablet 20 mg  20 mg Oral QHS    sodium chloride (NS) flush 5-10 mL  5-10 mL IntraVENous Q8H    aspirin chewable tablet 81 mg  81 mg Oral DAILY    enoxaparin (LOVENOX) injection 30 mg  30 mg SubCUTAneous Q24H    meclizine (ANTIVERT) tablet 25 mg  25 mg Oral Q6H       IMPRESSION:RECOMMENDATIONS:  Zayda Garcia is a 74 y.o. female who presents with vertigo. Work up is negative c/w Acute Vestibular Neuronitis, Have asked PT to perform Epley manuvers, from my standpoint she can go home to convalesce after PT work. I had a long discussion about  Vestibular Neuronitis with her and her     Matt So. Aaron Chavira MD  Neurologist    This note will not be viewable in [address]

## 2017-10-02 NOTE — PROGRESS NOTES
Bedside and Verbal shift change report given to [address] (oncoming nurse) by Maryjane Laureano (offgoing nurse). Report included the following information SBAR, Kardex, ED Summary, Intake/Output, MAR, Recent Results, Med Rec Status and Cardiac Rhythm Sinus Rhythm.

## 2017-10-02 NOTE — PROGRESS NOTES
Telephoned Dr. Lakisha Young office regarding patient's inquiry to take home prescription of Protonix and to discontinue IV access. Only option was to leave a voicemail.

## 2017-10-02 NOTE — PROGRESS NOTES
Telephone order received from Dr. Tammie Bruner for Protonix 40 mg PO every morning. She may take her supply from home.

## 2017-10-03 ENCOUNTER — TELEPHONE (OUTPATIENT)
Dept: INTERNAL MEDICINE CLINIC | Age: 67
End: 2017-10-03

## 2017-10-03 VITALS
SYSTOLIC BLOOD PRESSURE: 150 MMHG | HEART RATE: 60 BPM | TEMPERATURE: 97.7 F | OXYGEN SATURATION: 100 % | BODY MASS INDEX: 20.4 KG/M2 | WEIGHT: 130 LBS | DIASTOLIC BLOOD PRESSURE: 73 MMHG | RESPIRATION RATE: 16 BRPM | HEIGHT: 67 IN

## 2017-10-03 PROBLEM — H81.23 VESTIBULAR NEURONITIS OF BOTH EARS: Status: ACTIVE | Noted: 2017-10-03

## 2017-10-03 LAB
ANION GAP SERPL CALC-SCNC: 8 MMOL/L (ref 5–15)
BUN SERPL-MCNC: 19 MG/DL (ref 6–20)
BUN/CREAT SERPL: 26 (ref 12–20)
CALCIUM SERPL-MCNC: 8.3 MG/DL (ref 8.5–10.1)
CHLORIDE SERPL-SCNC: 108 MMOL/L (ref 97–108)
CO2 SERPL-SCNC: 26 MMOL/L (ref 21–32)
CREAT SERPL-MCNC: 0.74 MG/DL (ref 0.55–1.02)
ERYTHROCYTE [DISTWIDTH] IN BLOOD BY AUTOMATED COUNT: 12.5 % (ref 11.5–14.5)
GLUCOSE SERPL-MCNC: 96 MG/DL (ref 65–100)
HCT VFR BLD AUTO: 35.8 % (ref 35–47)
HGB BLD-MCNC: 12.1 G/DL (ref 11.5–16)
MCH RBC QN AUTO: 30.7 PG (ref 26–34)
MCHC RBC AUTO-ENTMCNC: 33.8 G/DL (ref 30–36.5)
MCV RBC AUTO: 90.9 FL (ref 80–99)
PLATELET # BLD AUTO: 201 K/UL (ref 150–400)
POTASSIUM SERPL-SCNC: 3.8 MMOL/L (ref 3.5–5.1)
RBC # BLD AUTO: 3.94 M/UL (ref 3.8–5.2)
SODIUM SERPL-SCNC: 142 MMOL/L (ref 136–145)
WBC # BLD AUTO: 5 K/UL (ref 3.6–11)

## 2017-10-03 PROCEDURE — 74011250636 HC RX REV CODE- 250/636: Performed by: INTERNAL MEDICINE

## 2017-10-03 PROCEDURE — 97530 THERAPEUTIC ACTIVITIES: CPT | Performed by: OCCUPATIONAL THERAPIST

## 2017-10-03 PROCEDURE — 74011250637 HC RX REV CODE- 250/637: Performed by: INTERNAL MEDICINE

## 2017-10-03 PROCEDURE — 80048 BASIC METABOLIC PNL TOTAL CA: CPT | Performed by: INTERNAL MEDICINE

## 2017-10-03 PROCEDURE — 85027 COMPLETE CBC AUTOMATED: CPT | Performed by: INTERNAL MEDICINE

## 2017-10-03 PROCEDURE — 36415 COLL VENOUS BLD VENIPUNCTURE: CPT | Performed by: INTERNAL MEDICINE

## 2017-10-03 RX ORDER — PANTOPRAZOLE SODIUM 40 MG/1
40 TABLET, DELAYED RELEASE ORAL
Status: DISCONTINUED | OUTPATIENT
Start: 2017-10-03 | End: 2017-10-03 | Stop reason: HOSPADM

## 2017-10-03 RX ORDER — MECLIZINE HYDROCHLORIDE 25 MG/1
25 TABLET ORAL
Qty: 21 TAB | Refills: 0 | Status: SHIPPED | OUTPATIENT
Start: 2017-10-03 | End: 2017-10-13

## 2017-10-03 RX ADMIN — PANTOPRAZOLE SODIUM 40 MG: 40 TABLET, DELAYED RELEASE ORAL at 12:45

## 2017-10-03 RX ADMIN — MECLIZINE 25 MG: 12.5 TABLET ORAL at 12:43

## 2017-10-03 RX ADMIN — Medication 10 ML: at 06:24

## 2017-10-03 RX ADMIN — MECLIZINE 25 MG: 12.5 TABLET ORAL at 06:23

## 2017-10-03 RX ADMIN — ASPIRIN 81 MG 81 MG: 81 TABLET ORAL at 10:18

## 2017-10-03 NOTE — PROGRESS NOTES
No surgery found *  * No surgery found *  Bedside and Verbal shift change report given to  Yobani Barnett RN (oncoming nurse) by Baltazar Dunn RN (offgoing nurse). Report included the following information SBAR, Kardex, Recent Results, Med Rec Status and Cardiac Rhythm SR.     Zone Phone:   [phone]        Significant changes during shift:  none           Patient Information     Mignon Saldaña  74 y.o.  9/30/2017 12:11 PM by Jemima Sears MD. Mignon Saldaña was admitted from Home     Problem List          Patient Active Problem List     Diagnosis Date Noted    CVA (cerebral vascular accident) ([facility] [address] 09/30/2017           Past Medical History:   Diagnosis Date    CAD (coronary artery disease)              Core Measures:     CVA: Yes Yes  CHF:No No  PNA:No No     Post Op Surgical (If Applicable):      Number times ambulated in hallway past shift:  None  Number of times OOB to chair past shift:   0  NG Tube: No  Incentive Spirometer: No  Drains: No   Volume  0  Dressing Present:  No  Flatus:  Not applicable     Activity Status:     OOB to Chair No  Ambulated this shift No   Bed Rest No     Supplemental O2: (If Applicable)     NC No  NRB No  Venti-mask No  On 0 Liters/min        LINES AND DRAINS:     Central Line? No   PICC LINE? No   Urinary Catheter? No   DVT prophylaxis:     DVT prophylaxis Med- yes refused  DVT prophylaxis SCD or KISHAN- No      Wounds: (If Applicable)     Wounds- No     Location 0     Patient Safety:     Falls Score Total Score: 3  Safety Level_______  Bed Alarm On? Yes  Sitter?  No, family at the bedside     Plan for upcoming shift: Neurochecks, safety, continue with meclizine      Discharge Plan: Yes when stable     Active Consults:  IP CONSULT TO NEUROLOGY  IP CONSULT TO PRIMARY CARE PROVIDER

## 2017-10-03 NOTE — DISCHARGE SUMMARY
[address]  [phone]    Hospital Discharge Summary        Patient ID:  Mi Bermudez  [id number]  74 y.o.  1950    Admit date: 9/30/2017  Discharge date and time: 10/03/2017    Admission Diagnoses: CVA (cerebral vascular accident) (HCC);CVA (cerebral vascul*     Discharge Diagnoses:     Principal Problem:    Vestibular neuronitis of both ears (10/3/2017)    Active Problems:    Dizziness (10/1/2017)      Nausea & vomiting (10/1/2017)      Hyperlipidemia (10/1/2017)      ASCVD (arteriosclerotic cardiovascular disease) (10/1/2017)         Past Medical History:   Diagnosis Date    CAD (coronary artery disease)     Vestibular neuronitis of both ears 10/3/2017         PCP: Sofie Hill MD    Consults: Neurology      History of Present Illness: Per hospitalist -   Mi Bermudez is a 74 y.o.  female w pmhx significant for CAD and factor V heterogenous, present to ED complaining of dizziness associated with nausea, photophobia, and vomiting, all started earlier today while driving. Denies any similar symptoms in the past.  She states she was driving to her daughter's house today and had to pull over due to severe dizziness with distortion of her vision. Daughter picked pt up and that's when she vomited multiple times prior to ED arrival.  Other associated symptoms including, generalized weakness with unsteadiness with ambulation and room spinning with positional changes. She denies any fever, chills, HA,   In the ER, vitals: T98, P 63, NINA 165/78, SpO2 99%  Labs reviewed, UA neg . CT head negative, CTA neg.     We were asked to admit for work up and evaluation of the above problems.      Admission Physical Exam: Per hospitalist -   VITALS:         Visit Vitals    /71    Pulse 61    Temp 98 °F (36.7 °C)    Resp 15    Ht 5' 7\" (1.702 m)    Wt 59 kg (130 lb)    SpO2 99%    BMI 20.36 kg/m2       PHYSICAL EXAM:     General:                    Alert, cooperative, no distress, appears stated age. HEENT:                     Atraumatic, anicteric sclerae, b/l horizontal nystagmus                                              No oral ulcers, mucosa moist, throat clear, dentition fair  Neck:                                   Supple, symmetrical,  thyroid: non tender  Lungs:                       Clear to auscultation bilaterally. No Wheezing or Rhonchi. No rales. Chest wall:                No tenderness  No Accessory muscle use. Heart:                                  Regular  rhythm,  No  murmur   No edema  Abdomen:                  Soft, non-tender. Not distended. Bowel sounds normal  Extremities:               No cyanosis. No clubbing,                                                Skin turgor normal, Capillary refill normal, Radial dial pulse 2+  Skin:                                    Not pale. Not Jaundiced  No rashes   Psych:                                  Not anxious or agitated. Neurologic:                B/l resting nystagmus. No facial asymmetry. No aphasia or slurred speech. Symmetrical strength, Sensation grossly intact. Alert and oriented X 4.          Admission Labs:  CBC: 9/30/2017: HCT 36.9 % (Ref range: 35.0 - 47.0 %); HGB 12.5 g/dL (Ref range: 11.5 - 16.0 g/dL); PLATELET 438 K/uL (Ref range: 150 - 400 K/uL); RBC 4.15 M/uL (Ref range: 3.80 - 5.20 M/uL); WBC 9.5 K/uL (Ref range: 3.6 - 11.0 K/uL)   BMP: 9/30/2017: BUN 18 MG/DL (Ref range: 6 - 20 MG/DL); Calcium 8.1 MG/DL* (Ref range: 8.5 - 10.1 MG/DL); Chloride 108 mmol/L (Ref range: 97 - 108 mmol/L); CO2 26 mmol/L (Ref range: 21 - 32 mmol/L); Creatinine 0.72 MG/DL (Ref range: 0.55 - 1.02 MG/DL); Glucose 123 mg/dL* (Ref range: 65 - 100 mg/dL); Potassium 3.6 mmol/L (Ref range: 3.5 - 5.1 mmol/L); Sodium 140 mmol/L (Ref range: 136 - 145 mmol/L)  Coagulation: 9/30/2017: aPTT 24.5 sec (Ref range: 22.1 - 32.5 sec);  INR 1.0   (Ref range: 0.9 - 1.1  ); Prothrombin time 10.3 sec (Ref range: 9.0 - 11.1 sec)  Cardiac markers: 9/30/2017:  U/L (Ref range: 26 - 192 U/L)  Liver: 9/30/2017: Albumin 4.0 g/dL (Ref range: 3.5 - 5.0 g/dL); Alk. phosphatase 58 U/L (Ref range: 45 - 117 U/L); AST (SGOT) 20 U/L (Ref range: 15 - 37 U/L); Protein, total 7.1 g/dL (Ref range: 6.4 - 8.2 g/dL)    Discharge Labs:  Recent Results (from the past 24 hour(s))   CBC W/O DIFF    Collection Time: 10/03/17  2:49 AM   Result Value Ref Range    WBC 5.0 3.6 - 11.0 K/uL    RBC 3.94 3.80 - 5.20 M/uL    HGB 12.1 11.5 - 16.0 g/dL    HCT 35.8 35.0 - 47.0 %    MCV 90.9 80.0 - 99.0 FL    MCH 30.7 26.0 - 34.0 PG    MCHC 33.8 30.0 - 36.5 g/dL    RDW 12.5 11.5 - 14.5 %    PLATELET 354 944 - 897 K/uL   METABOLIC PANEL, BASIC    Collection Time: 10/03/17  2:49 AM   Result Value Ref Range    Sodium 142 136 - 145 mmol/L    Potassium 3.8 3.5 - 5.1 mmol/L    Chloride 108 97 - 108 mmol/L    CO2 26 21 - 32 mmol/L    Anion gap 8 5 - 15 mmol/L    Glucose 96 65 - 100 mg/dL    BUN 19 6 - 20 MG/DL    Creatinine 0.74 0.55 - 1.02 MG/DL    BUN/Creatinine ratio 26 (H) 12 - 20      GFR est AA >60 >60 ml/min/1.73m2    GFR est non-AA >60 >60 ml/min/1.73m2    Calcium 8.3 (L) 8.5 - 10.1 MG/DL       Significant Diagnostic Studies:   CT head - No acute intracranial process seen     CTA head - No major vessel occlusion, aneurysm, dissection, intraluminal  thrombus, or significant stenosis     MRI brain/neck -  No significant significant stenosis in the neck. ACAs not differentiated on MRA of the COW     MRI brain - Normal study for age     Echocardiogram - Left ventricle: The ventricle was mildly dilated. Systolic function was at  the lower limits of normal. Ejection fraction was estimated in the range  of 50 % to 55 %. No obvious wall motion abnormalities identified in the  views obtained. Mitral valve: There was trivial regurgitation. Aortic valve:  There was no stenosis    Hospital Course: She was admitted to neuro-telemetry, neurology was consulted and she underwent a stroke work up which was negative. Given antivert for symptoms with minor improvement. Neurology felt patient had vestibular neuronitis. Received PT/OT. Discharged home once work up complete and patient medically stable. Disposition: home    Activity: as tolerated. No driving     Diet: regular    Follow up appointment: Dr Alicia Malcolm in 1 week     Patient Instructions:   Current Discharge Medication List      START taking these medications    Details   meclizine (ANTIVERT) 25 mg tablet Take 1 Tab by mouth three (3) times daily as needed for up to 10 days. Indications: VERTIGO  Qty: 21 Tab, Refills: 0         CONTINUE these medications which have NOT CHANGED    Details   atorvastatin (LIPITOR) 20 mg tablet Take  by mouth daily.              Wound Care: None needed    Signed:  Demetra Rai MD  10/3/2017  6:12 AM

## 2017-10-03 NOTE — PROGRESS NOTES
Discharge teaching done verbalized understanding, no prescription, she stated probably Dr Sameer Pendleton called for her prescription, she did not want to take her meclizine due 6 PM she stated she will take at home.  Left the unit via W/C accompanied by PCT, took all her belongings

## 2017-10-03 NOTE — PROGRESS NOTES
SW met with pt to inquire if interested in OP rehab. Pt stated she felt like she would OP vestibular therapy. SW contacted pt PCP office to inquire if PCP would sign orders for OP therapy. Office returned call and stated PCP would sign referral for therapy. Referral faxed to PCP office. SW will arrange OP therapy with [facility]. Follow up appt made and on AVS.    4:52PM  Pt requested RW at discharge. Referral sent to [facility] via [employer] for their review and they have accepted. RW delivered prior to discharge.     James Tim, MSW  Ext [phone]

## 2017-10-03 NOTE — PROGRESS NOTES
Problem: TIA/CVA Stroke: Day 2 Until Discharge  Goal: Activity/Safety  Outcome: Progressing Towards Goal  Pt continues to have some double vision when looking to L with L eye. Eyes track well. Only L eye seems affected. May need to get pt an eye patch.

## 2017-10-03 NOTE — PROGRESS NOTES
Bedside and Verbal shift change report given to [address] (oncoming nurse) by Bell Garcia (offgoing nurse). Report included the following information SBAR, Kardex, ED Summary, Intake/Output, MAR, Recent Results, Med Rec Status and Cardiac Rhythm Sinus Rhythm.

## 2017-10-03 NOTE — ROUTINE PROCESS
Bedside and Verbal shift change report given to Veronica Zuniga RN (oncoming nurse) by Raúl Stokes nurse). Report included the following information SBAR, Kardex, Recent Results, Med Rec Status and Cardiac Rhythm SR.      Zone Phone:   [phone]          Significant changes during shift:  none              Patient Information      Bora Kilpatrick  74 y.o.  9/30/2017 12:11 PM by Yonas Nunez MD. Mendel Oliva admitted from Home      Problem List              Patient Active Problem List      Diagnosis Date Noted    CVA (cerebral vascular accident) ([address] 09/30/2017               Past Medical History:   Diagnosis Date    CAD (coronary artery disease)                  Core Measures:      CVA: Yes Yes  CHF:No No  PNA:No No      Post Op Surgical (If Applicable):           Activity Status:      OOB to Chair No  Ambulated this shift Yes   Bed Rest No      Supplemental F8: (FG Applicable)      NC No  NRB No  Venti-mask No  On 0 Liters/min          LINES AND DRAINS:      Central Line? No   PICC LINE? No   Urinary Catheter? No   DVT prophylaxis:      DVT prophylaxis Med- yes refused  DVT prophylaxis SCD or KISHAN- No       Wounds: (If Applicable)      Wounds- No      Location 0      Patient Safety:      Falls Score Total Score: 3  Safety Level_______  Bed Alarm On? Yes  Sitter?  No, family at the bedside      Plan for upcoming shift: Neurochecks, safety, continue with meclizine       Discharge Plan: Yes when stable      Active Consults:  IP CONSULT TO NEUROLOGY  IP CONSULT TO PRIMARY CARE PROVIDER

## 2017-10-03 NOTE — TELEPHONE ENCOUNTER
Interactive contact made with patient today, and the following topics were addressed:    Does Patient have a family member or caretaker to assist? YES-     Reason for hospitalization dizziness. Symptoms discussed YES    Need for referral to any agencies or community services? NO     Education provided to the beneficiary, family, guardian, and/or caretaker to support  self-management, independent living, and activities of daily living? NO    Is patient able to manage his or her own meds? YES    Reviewed discharge medications with the patient and/or caretaker, and she expresses understanding. Reminded patient of upcoming appointment on 10-9-17.

## 2017-10-03 NOTE — DISCHARGE INSTRUCTIONS
[address]  [address]  [phone]      Patient Discharge Instructions    Bee Schwartz / [id number] : 1950    Admitted 2017 Discharged: 10/3/2017     Take Home Medications        · It is important that you take the medication exactly as they are prescribed. · Keep your medication in the bottles provided by the pharmacist and keep a list of the medication names, dosages, and times to be taken in your wallet. · Do not take other medications without consulting your doctor. What to do at Home    Recommended diet: Regular Diet,     Recommended activity: Activity as tolerated,     Follow-up with Dr Alicia Malcolm  in 1 week        Information obtained by :  I understand that if any problems occur once I am at home I am to contact my physician. I understand and acknowledge receipt of the instructions indicated above.                                                                                                                                            Physician's or R.N.'s Signature                                                                  Date/Time                                                                                                                                              Patient or Representative Signature                                                          Date/Time

## 2017-10-03 NOTE — PROGRESS NOTES
Visited by Estefanía Pryor Partner 10/3/17.     RAJIV Frances, [facility], [address]    [address] Paging Service  [phone]

## 2017-10-03 NOTE — PROGRESS NOTES
Problem: Self Care Deficits Care Plan (Adult)  Goal: *Acute Goals and Plan of Care (Insert Text)  Occupational Therapy Goals  Initiated 10/1/2017   1. Patient will perform grooming standing at the sink with supervision/set-up within 7 day(s). 2. Patient will perform lower body dressing with supervision/set-up within 7 day(s). 3. Patient will perform toilet transfers with supervision/set-up within 7 day(s). 4. Patient will perform all aspects of toileting with supervision/set-up within 7 day(s). OCCUPATIONAL THERAPY TREATMENT  Patient: Stephanie Kaye (4 y.o. female)  Date: 10/3/2017  Diagnosis: CVA (cerebral vascular accident) [facility])  CVA (cerebral vascular accident) [facility]) Vestibular neuronitis of both ears       Precautions: Fall, Bed Alarm      ASSESSMENT:  Pt reports dizziness during mobility and double vision in far L visual field. Her MRI was negative. Pt encouraged to perform monocular (L and R eyes) and binocular visual tracking exercises within her environment. Pt demonstrated and verbalized understanding. Pt continues to have dizziness and reported that she would feel safer using a walker when ambulating at home. She will try to find one, otherwise, she will have the  her one for home use. Pt reports that her  will be at home with her, but may need someone to be with her when her  is away from home (due to her dizziness). Discussed and recommended Outpatient Neuro/ Vestibular Rehab at discharge for comprehensive assessment and treatment. Pt verbalized understanding. Progression toward goals:  [ ]       Improving appropriately and progressing toward goals  [X]       Improving slowly and progressing toward goals  [ ]       Not making progress toward goals and plan of care will be adjusted       PLAN:  Patient continues to benefit from skilled intervention to address the above impairments. Continue treatment per established plan of care.   Discharge Recommendations:  Outpatient Vestibular rehab   Further Equipment Recommendations for Discharge:  Pt reports that she would like a walker        SUBJECTIVE:   Patient stated *there are 2 of you.   (double vision in far L visual field per pt report)      OBJECTIVE DATA SUMMARY:   Cognitive/Behavioral Status:  Neurologic State: Alert  Orientation Level: Oriented X4  Cognition: Follows commands                                               Neuro Re-Education:   Encouraged visual tracking exercises monocularly and binocularly-pt demonstrated and verbalized understanding.         Therapeutic Exercises:   As above  Pain:  Pain Scale 1: Numeric (0 - 10)  Pain Intensity 1: 0                 After treatment:   [ ] Patient left in no apparent distress sitting up in chair  [X] Patient left in no apparent distress in bed  [X] Call bell left within reach  [X] Nursing/-casemanager aware of recommendations  [ ] Caregiver present  [ ] Bed alarm activated      COMMUNICATION/COLLABORATION:   The patients plan of care was discussed with: Physical Therapist and      Raheem Bourgeois OTR/L  Time Calculation: 12 mins

## 2017-10-03 NOTE — PROGRESS NOTES
[address]  [phone]      Medical Progress Note      NAME: Sita Ohara   :  1950  MRM:  [id number]    Date/Time: 10/3/2017  5:37 AM      Subjective:     Alert, oriented and AVSS. Continues to have positional vertigo symptoms with position change without vomiting. No headache or neuro deficits. Past Medical History reviewed and unchanged from Admission History and Physical and/or prior progress note/electronic medical record    Medications reviewed. ROS:      General: negative for fever, chills, sweats, weakness  Eyes: negative for blurred vision, eye pain, loss of vision, diplopia  Ear Nose and Throat: negative for rhinorrhea, pharyngitis, otalgia, tinnitus, speech or swallowing difficulties  Respiratory:  negative for cough, sputum production, SOB, wheezing, LÓPEZ, pleuritic pain  Cardiology:  negative for chest pain, palpitations, orthopnea, PND, edema, syncope   Gastrointestinal: negative for abdominal pain, N/V, dysphagia, change in bowel habits, bleeding  Genitourinary: negative for frequency, urgency, dysuria, hematuria, incontinence  Neurological: positive for dizziness    Objective:     Last 24hrs VS reviewed since prior progress note.  Most recent are:    Visit Vitals    /72 (BP 1 Location: Right arm, BP Patient Position: At rest)    Pulse (!) 58    Temp 98.2 °F (36.8 °C)    Resp 16    Ht 5' 7\" (1.702 m)    Wt 130 lb (59 kg)    SpO2 98%    Breastfeeding No    BMI 20.36 kg/m2     SpO2 Readings from Last 6 Encounters:   10/03/17 98%   11 99%            Intake/Output Summary (Last 24 hours) at 10/03/17 0539  Last data filed at 10/03/17 0041   Gross per 24 hour   Intake                0 ml   Output              400 ml   Net             -400 ml          Physical Exam:    General appearance: alert, cooperative, no distress, appears stated age  Eyes: negative  Neck: supple, symmetrical, trachea midline, no adenopathy, thyroid: not enlarged, symmetric, no tenderness/mass/nodules, no carotid bruit and no JVD  Lungs: clear to auscultation bilaterally  Heart: regular rate and rhythm, S1, S2 normal, no murmur, click, rub or gallop  Abdomen: soft, non-tender. Bowel sounds normal. No masses,  no organomegaly  Extremities: extremities normal, atraumatic, no cyanosis or edema  Skin: Skin color, texture, turgor normal. No rashes or lesions  Neurologic: Grossly normal, no nystagmus, focal weakness    Data Review:    Lab:    BMP:   Lab Results   Component Value Date/Time     10/03/2017 02:49 AM    K 3.8 10/03/2017 02:49 AM     10/03/2017 02:49 AM    CO2 26 10/03/2017 02:49 AM    AGAP 8 10/03/2017 02:49 AM    GLU 96 10/03/2017 02:49 AM    BUN 19 10/03/2017 02:49 AM    CREA 0.74 10/03/2017 02:49 AM    GFRAA >60 10/03/2017 02:49 AM    GFRNA >60 10/03/2017 02:49 AM     CBC:   Lab Results   Component Value Date/Time    WBC 5.0 10/03/2017 02:49 AM    HGB 12.1 10/03/2017 02:49 AM    HCT 35.8 10/03/2017 02:49 AM     10/03/2017 02:49 AM     All labs reviewed in past 24 hours    Other pertinent lab: none    Imaging:  CT head - No acute intracranial process seen    CTA head - No major vessel occlusion, aneurysm, dissection, intraluminal  thrombus, or significant stenosis    MRI brain/neck -  No significant significant stenosis in the neck. ACAs not differentiated on MRA of the COW    MRI brain - Normal study for age    Echocardiogram - Left ventricle: The ventricle was mildly dilated. Systolic function was at  the lower limits of normal. Ejection fraction was estimated in the range  of 50 % to 55 %. No obvious wall motion abnormalities identified in the  views obtained. Mitral valve: There was trivial regurgitation. Aortic valve: There was no stenosis    Reviewed.         Assessment / Plan:     Principal Problem:    Vestibular neuronitis of both ears (10/3/2017)    Active Problems:    Dizziness (10/1/2017)      Nausea & vomiting (10/1/2017)      Hyperlipidemia (10/1/2017)      ASCVD (arteriosclerotic cardiovascular disease) (10/1/2017)        1. Continue meclizine  2. Progressive activity as tolerated  3.   Home today           Care Plan discussed with: Patient    Discussed:  Care Plan    Prophylaxis:  Lovenox    Disposition:  Home w/Family  ___________________________________________________    Attending Physician: Vale Ma MD

## 2017-10-06 PROBLEM — D68.51 HETEROZYGOUS FACTOR V LEIDEN MUTATION (HCC): Status: ACTIVE | Noted: 2017-10-06

## 2017-10-06 PROBLEM — K57.90 DIVERTICULOSIS OF INTESTINE WITHOUT BLEEDING: Status: ACTIVE | Noted: 2017-10-06

## 2017-10-06 PROBLEM — F32.A DEPRESSION: Status: ACTIVE | Noted: 2017-10-06

## 2017-10-06 PROBLEM — K58.9 IBS (IRRITABLE BOWEL SYNDROME): Status: ACTIVE | Noted: 2017-10-06

## 2017-10-09 ENCOUNTER — OFFICE VISIT (OUTPATIENT)
Dept: INTERNAL MEDICINE CLINIC | Age: 67
End: 2017-10-09

## 2017-10-09 VITALS
BODY MASS INDEX: 20.88 KG/M2 | HEIGHT: 67 IN | HEART RATE: 70 BPM | WEIGHT: 133 LBS | SYSTOLIC BLOOD PRESSURE: 128 MMHG | DIASTOLIC BLOOD PRESSURE: 70 MMHG

## 2017-10-09 DIAGNOSIS — H81.23 VESTIBULAR NEURONITIS OF BOTH EARS: Primary | ICD-10-CM

## 2017-10-09 DIAGNOSIS — I25.10 ASCVD (ARTERIOSCLEROTIC CARDIOVASCULAR DISEASE): ICD-10-CM

## 2017-10-09 DIAGNOSIS — E78.00 PURE HYPERCHOLESTEROLEMIA: ICD-10-CM

## 2017-10-09 RX ORDER — PANTOPRAZOLE SODIUM 40 MG/1
40 TABLET, DELAYED RELEASE ORAL DAILY
COMMUNITY
End: 2018-01-16 | Stop reason: ALTCHOICE

## 2017-10-09 NOTE — PROGRESS NOTES
This note will not be viewable in [address] Salvatore Dixon is a 74 y.o. female and presents with Hospital Follow Up  . Subjective: The patient presents to the office today and transition of care subsequent to a hospitalization from  at [facility] when she was admitted from September 30 until October 3 with the acute onset of dizziness thought secondary to a CVA. The patient underwent a stroke workup which was negative. Neurology believes that she had acute vestibular neuronitis. She had minimal improvement with Antivert. She started with physical therapy and began to improve. After discharge we made contact with the patient at home on the day of discharge and make sure that her medications were reconciled, completed forms for physical therapy and make sure her follow-up appointment had been finalized. The patient has been receiving physical therapy and has been slowly improving. She continues to walk with a walker in order to stabilize herself and can stand for a few seconds without assistance. The patient has had no falls. She is not driving at this point. She denies any nausea with her symptoms and is been able to eat. The patient has been compliant with her medications. She has had no new symptoms. She denies any tinnitus or loss of hearing and has had no focal neurological problems. Past Medical History:   Diagnosis Date    ASCVD (arteriosclerotic cardiovascular disease) 10/1/2017    CAD (coronary artery disease)     Depression 10/6/2017    Diverticulosis of intestine without bleeding 10/6/2017    With diverticulitis    Heterozygous factor V Leiden mutation ([facility] [address] 10/6/2017    Hyperlipidemia 10/1/2017    IBS (irritable bowel syndrome) 10/6/2017    Vestibular neuronitis of both ears 10/3/2017     History reviewed. No pertinent surgical history.   No Known Allergies  Current Outpatient Prescriptions   Medication Sig Dispense Refill    pantoprazole (PROTONIX) 40 mg tablet Take 40 mg by mouth daily.  meclizine (ANTIVERT) 25 mg tablet Take 1 Tab by mouth three (3) times daily as needed for up to 10 days. Indications: VERTIGO 21 Tab 0    atorvastatin (LIPITOR) 20 mg tablet Take  by mouth daily.        Social History     Social History    Marital status:      Spouse name: N/A    Number of children: N/A    Years of education: N/A     Social History Main Topics    Smoking status: Never Smoker    Smokeless tobacco: Never Used    Alcohol use No    Drug use: No    Sexual activity: No     Other Topics Concern    None     Social History Narrative     Family History   Problem Relation Age of Onset    No Known Problems Mother     No Known Problems Father     Sudden Death Other        Health Maintenance   Topic Date Due    Hepatitis C Screening  1950    DTaP/Tdap/Td series (1 - Tdap) 09/26/1973    PAP AKA CERVICAL CYTOLOGY  09/26/1973    BREAST CANCER SCRN MAMMOGRAM  09/26/2002    FOBT Q 1 YEAR AGE 50-75  09/26/2002    ZOSTER VACCINE AGE 60>  07/26/2012    INFLUENZA AGE 9 TO ADULT  08/01/2017    GLAUCOMA SCREENING Q2Y  09/26/2017    OSTEOPOROSIS SCREENING (DEXA)  09/26/2017    Pneumococcal 65+ Low/Medium Risk (1 of 2 - PCV13) 09/26/2017    MEDICARE YEARLY EXAM  09/26/2017        Review of Systems  Constitutional: negative for fevers, chills, anorexia and weight loss  Eyes:   negative for visual disturbance and irritation  ENT:   negative for tinnitus,sore throat,nasal congestion,ear pain,hoarseness  Respiratory:  negative for cough, hemoptysis, dyspnea,wheezing  CV:   negative for chest pain, palpitations, lower extremity edema  GI:   negative for nausea, vomiting, diarrhea, abdominal pain,melena  Endo:               negative for polyuria,polydipsia,polyphagia,heat intolerance  Genitourinary: negative for frequency, dysuria and hematuria  Integumentary: negative for rash and pruritus  Hematologic:  negative for easy bruising and gum/nose bleeding  Musculoskel: negative for myalgias, arthralgias, back pain, muscle weakness, joint pain  Neurological:  negative for headaches,  vertigo, memory problems and gait. Positive for vertigo   Behavl/Psych: negative for feelings of anxiety, depression, mood changes  ROS otherwise negative      Objective:  Visit Vitals    /70    Pulse 70    Ht 5' 7\" (1.702 m)    Wt 133 lb (60.3 kg)    BMI 20.83 kg/m2     Body mass index is 20.83 kg/(m^2). Physical Exam:   General appearance - alert, well appearing, and in no distress  Mental status - alert, oriented to person, place, and time  EYE-ESTRADA, EOMI,conjunctiva normal bilaterally, lids normal  ENT-ENT exam normal, no neck nodes or sinus tenderness  Nose - normal and patent, no erythema,  Or discharge   Mouth - mucous membranes moist, pharynx normal without lesions  Neck - supple, no significant adenopathy or bruit  Chest - clear to auscultation, no wheezes, rales or rhonchi. Heart - normal rate, regular rhythm, normal S1, S2, no murmurs, rubs, clicks or gallops   Abdomen - soft, nontender, nondistended, no masses or organomegaly  Lymph- no adenopathy palpable  Ext-peripheral pulses normal, no pedal edema, no clubbing or cyanosis  Skin-Warm and dry. no hyperpigmentation, vitiligo, or suspicious lesions  Neuro -alert, oriented, normal speech, no focal findings or movement disorder noted      Assessment/Plan:  Diagnoses and all orders for this visit:    Vestibular neuronitis of both ears    Pure hypercholesterolemia    ASCVD (arteriosclerotic cardiovascular disease)        Other instructions: The patient's medications were reviewed and reconciled and no change in her current regimen is made. Hospital records are accessed and reviewed    The patient will continue her home health physical therapy for which we have completed orders in the interim since she has been discharged.     She will not drive until vertigo resolves    Continued use of rolling walker for stability    Follow-up 3 months    Follow-up Disposition:  Return in about 3 months (around 1/9/2018). I have reviewed with the patient details of the assessment and plan and all questions were answered. Relevent patient education was performed. The most recent lab findings were reviewed with the patient. An After Visit Summary was printed and given to the patient.     Bill Byers MD

## 2017-10-09 NOTE — PROGRESS NOTES
Rollo Bumpers is a 74 y.o. female presenting for Hospital Follow Up  .     1. Have you been to the ER, urgent care clinic since your last visit? Hospitalized since your last visit? Yes When: 9/30/17 Where: [address] [facility] Reason for visit: vertigo    2. Have you seen or consulted any other health care providers outside of the [address] [place] [address] since your last visit? Include any pap smears or colon screening. No    Fall Risk Assessment, last 12 mths 10/9/2017   Able to walk? Yes   Fall in past 12 months? No         Abuse Screening Questionnaire 10/9/2017   Do you ever feel afraid of your partner? N   Are you in a relationship with someone who physically or mentally threatens you? N   Is it safe for you to go home? Y       No flowsheet data found. There are no discontinued medications.

## 2017-10-09 NOTE — PATIENT INSTRUCTIONS
Vertigo: Care Instructions  Your Care Instructions  Vertigo is the feeling that you or your surroundings are moving when there is no actual movement. It is often described as a feeling of spinning, whirling, falling, or tilting. Vertigo may make you vomit or feel nauseated. You may have trouble standing or walking and may lose your balance. Vertigo is often related to an inner ear problem, but it can have other more serious causes. If vertigo continues, you may need more tests to find its cause. Follow-up care is a key part of your treatment and safety. Be sure to make and go to all appointments, and call your doctor if you are having problems. Its also a good idea to know your test results and keep a list of the medicines you take. How can you care for yourself at home? · Do not lie flat on your back. Prop yourself up slightly. This may reduce the spinning feeling. Keep your eyes open. · Move slowly so that you do not fall. · If your doctor recommends medicine, take it exactly as directed. · Do not drive while you are having vertigo. Certain exercises, called Vásquez-Daroff exercises, can help decrease vertigo. To do Vásquez-Daroff exercises:  · Sit on the edge of a bed or sofa and quickly lie down on the side that causes the worst vertigo. Lie on your side with your ear down. · Stay in this position for at least 30 seconds or until the vertigo goes away. · Sit up. If this causes vertigo, wait for it to stop. · Repeat the procedure on the other side. · Repeat this 10 times. Do these exercises 2 times a day until the vertigo is gone. When should you call for help? Call 911 anytime you think you may need emergency care. For example, call if:  · You passed out (lost consciousness). · You have symptoms of a stroke. These may include:  ¨ Sudden numbness, tingling, weakness, or loss of movement in your face, arm, or leg, especially on only one side of your body. ¨ Sudden vision changes.   ¨ Sudden trouble speaking. ¨ Sudden confusion or trouble understanding simple statements. ¨ Sudden problems with walking or balance. ¨ A sudden, severe headache that is different from past headaches. Call your doctor now or seek immediate medical care if:  · Vertigo occurs with a fever, a headache, or ringing in your ears. · You have new or increased nausea and vomiting. Watch closely for changes in your health, and be sure to contact your doctor if:  · Vertigo gets worse or happens more often. · Vertigo has not gotten better after 2 weeks. Where can you learn more? Go to http://kyler-mehrdad.info/. Enter I377 in the search box to learn more about \"Vertigo: Care Instructions. \"  Current as of: July 29, 2016  Content Version: 11.3  © 5780-3017 [employer]. Care instructions adapted under license by [employer] (which disclaims liability or warranty for this information). If you have questions about a medical condition or this instruction, always ask your healthcare professional. [place] [address] any warranty or liability for your use of this information.

## 2017-10-09 NOTE — MR AVS SNAPSHOT
Visit Information Date & Time Provider Department Dept. Phone Encounter #  
 10/9/2017  1:10 PM Demetra Rai MD [facility] [id number] Follow-up Instructions Return in about 3 months (around 1/9/2018). Upcoming Health Maintenance Date Due Hepatitis C Screening 1950 DTaP/Tdap/Td series (1 - Tdap) 9/26/1973 PAP AKA CERVICAL CYTOLOGY 9/26/1973 BREAST CANCER SCRN MAMMOGRAM 9/26/2002 FOBT Q 1 YEAR AGE 50-75 9/26/2002 ZOSTER VACCINE AGE 60> 7/26/2012 INFLUENZA AGE 9 TO ADULT 8/1/2017 GLAUCOMA SCREENING Q2Y 9/26/2017 OSTEOPOROSIS SCREENING (DEXA) 9/26/2017 Pneumococcal 65+ Low/Medium Risk (1 of 2 - PCV13) 9/26/2017 MEDICARE YEARLY EXAM 9/26/2017 Allergies as of 10/9/2017  Review Complete On: 10/9/2017 By: Demetra Rai MD  
 No Known Allergies Current Immunizations  Never Reviewed No immunizations on file. Not reviewed this visit You Were Diagnosed With   
  
 Codes Comments Vestibular neuronitis of both ears    -  Primary ICD-10-CM: H81.23 
ICD-9-CM: 386.12 Pure hypercholesterolemia     ICD-10-CM: E78.00 ICD-9-CM: 272.0 ASCVD (arteriosclerotic cardiovascular disease)     ICD-10-CM: I25.10 ICD-9-CM: 429.2, 440.9 Vitals BP Pulse Height(growth percentile) Weight(growth percentile) BMI OB Status 128/70 70 5' 7\" (1.702 m) 133 lb (60.3 kg) 20.83 kg/m2 Postmenopausal  
 Smoking Status Never Smoker BMI and BSA Data Body Mass Index Body Surface Area  
 20.83 kg/m 2 1.69 m 2 [facility] [phone] [facility] [address], [address] [place] [address] [phone] Your Updated Medication List  
  
   
This list is accurate as of: 10/9/17  2:02 PM.  Always use your most recent med list.  
  
  
  
  
 LIPITOR 20 mg tablet Generic drug:  atorvastatin Take  by mouth daily. meclizine 25 mg tablet Commonly known as:  ANTIVERT Take 1 Tab by mouth three (3) times daily as needed for up to 10 days. Indications: VERTIGO  
  
 pantoprazole 40 mg tablet Commonly known as:  PROTONIX Take 40 mg by mouth daily. Follow-up Instructions Return in about 3 months (around 1/9/2018). Introducing [facility] SERVICES! Lupe Blas introduces Gem patient portal. Now you can access parts of your medical record, email your doctor's office, and request medication refills online. 1. In your internet browser, go to [email or web address] 2. Click on the First Time User? Click Here link in the Sign In box. You will see the New Member Sign Up page. 3. Enter your Gem Access Code exactly as it appears below. You will not need to use this code after youve completed the sign-up process. If you do not sign up before the expiration date, you must request a new code. · Gem Access Code: [email or web address] Expires: 12/29/2017 12:41 PM 
 
4. Enter the last four digits of your Social Security Number (xxxx) and Date of Birth (mm/dd/yyyy) as indicated and click Submit. You will be taken to the next sign-up page. 5. Create a Gem ID. This will be your Gem login ID and cannot be changed, so think of one that is secure and easy to remember. 6. Create a Gem password. You can change your password at any time. 7. Enter your Password Reset Question and Answer. This can be used at a later time if you forget your password. 8. Enter your e-mail address. You will receive e-mail notification when new information is available in [address] 9. Click Sign Up. You can now view and download portions of your medical record. 10. Click the Download Summary menu link to download a portable copy of your medical information.  
 
If you have questions, please visit the Frequently Asked Questions section of the Phar[employer]. Remember, [employer] is NOT to be used for urgent needs. For medical emergencies, dial 911. Now available from your iPhone and Android! Please provide this summary of care documentation to your next provider. Your primary care clinician is listed as HANH Jones. If you have any questions after today's visit, please call [phone].

## 2017-11-20 ENCOUNTER — OFFICE VISIT (OUTPATIENT)
Dept: INTERNAL MEDICINE CLINIC | Age: 67
End: 2017-11-20

## 2017-11-20 VITALS
DIASTOLIC BLOOD PRESSURE: 82 MMHG | OXYGEN SATURATION: 99 % | BODY MASS INDEX: 20.5 KG/M2 | HEIGHT: 67 IN | WEIGHT: 130.6 LBS | SYSTOLIC BLOOD PRESSURE: 134 MMHG | HEART RATE: 76 BPM

## 2017-11-20 DIAGNOSIS — H81.23 VESTIBULAR NEURONITIS OF BOTH EARS: ICD-10-CM

## 2017-11-20 DIAGNOSIS — I25.10 ASCVD (ARTERIOSCLEROTIC CARDIOVASCULAR DISEASE): ICD-10-CM

## 2017-11-20 DIAGNOSIS — M79.602 LEFT ARM PAIN: Primary | ICD-10-CM

## 2017-11-20 NOTE — PROGRESS NOTES
Ashutosh Arriaga is a 74 y.o. female presenting for Arm Pain  . 1. Have you been to the ER, urgent care clinic since your last visit? Hospitalized since your last visit? No    2. Have you seen or consulted any other health care providers outside of the [address] [place] [address] since your last visit? Include any pap smears or colon screening. No    Fall Risk Assessment, last 12 mths 10/9/2017   Able to walk? Yes   Fall in past 12 months? No         Abuse Screening Questionnaire 10/9/2017   Do you ever feel afraid of your partner? N   Are you in a relationship with someone who physically or mentally threatens you? N   Is it safe for you to go home? Y       No flowsheet data found. There are no discontinued medications.

## 2017-11-20 NOTE — MR AVS SNAPSHOT
Visit Information Date & Time Provider Department Dept. Phone Encounter #  
 11/20/2017 10:20 AM Gerardo Schreiber [facility] [phone] [id number] Follow-up Instructions Return for As previously scheduled. Your Appointments 1/16/2018  1:30 PM  
FOLLOW UP 10 with MD Gerardo Schreiber [address] ([address]) Appt Note: 3 mo fu  
 [address] [id number] [address] [facility] [id number] Upcoming Health Maintenance Date Due Hepatitis C Screening 1950 DTaP/Tdap/Td series (1 - Tdap) 9/26/1973 BREAST CANCER SCRN MAMMOGRAM 9/26/2002 FOBT Q 1 YEAR AGE 50-75 9/26/2002 ZOSTER VACCINE AGE 60> 7/26/2012 Influenza Age 5 to Adult 8/1/2017 GLAUCOMA SCREENING Q2Y 9/26/2017 OSTEOPOROSIS SCREENING (DEXA) 9/26/2017 Pneumococcal 65+ Low/Medium Risk (1 of 2 - PCV13) 9/26/2017 MEDICARE YEARLY EXAM 9/26/2017 Allergies as of 11/20/2017  Review Complete On: 11/20/2017 By: Demetra Rai MD  
 No Known Allergies Current Immunizations  Never Reviewed No immunizations on file. Not reviewed this visit You Were Diagnosed With   
  
 Codes Comments Left arm pain    -  Primary ICD-10-CM: C63.263 ICD-9-CM: 729.5 ASCVD (arteriosclerotic cardiovascular disease)     ICD-10-CM: I25.10 ICD-9-CM: 429.2, 440.9 Vestibular neuronitis of both ears     ICD-10-CM: H81.23 
ICD-9-CM: 386.12 Vitals BP Pulse Height(growth percentile) Weight(growth percentile) SpO2 BMI  
 134/82 (BP 1 Location: Right arm, BP Patient Position: Sitting) 76 5' 7\" (1.702 m) 130 lb 9.6 oz (59.2 kg) 99% 20.45 kg/m2 OB Status Smoking Status Postmenopausal Never Smoker Vitals History BMI and BSA Data  Body Mass Index Body Surface Area  
 20.45 kg/m 2 1.67 m 2  
  
  
 Preferred Pharmacy Pharmacy Name Phone [facility] [phone] - [address], [address] AT [place] [address] [phone] Your Updated Medication List  
  
   
This list is accurate as of: 11/20/17 11:12 AM.  Always use your most recent med list.  
  
  
  
  
 LIPITOR 20 mg tablet Generic drug:  atorvastatin Take 10 mg by mouth daily. pantoprazole 40 mg tablet Commonly known as:  PROTONIX Take 40 mg by mouth daily. Follow-up Instructions Return for As previously scheduled. Introducing [facility] & HEALTH SERVICES! Dear Alicia Mckeon: Thank you for requesting a [facility] account. Our records indicate that you already have an active [facility] account. You can access your account anytime at [email or web address] In :60 Seconds Did you know that you can access your hospital and ER discharge instructions at any time in [facility]? You can also review all of your test results from your hospital stay or ER visit. Additional Information If you have questions, please visit the Frequently Asked Questions section of the [facility] website at [email or web address] Remember, [facility] is NOT to be used for urgent needs. For medical emergencies, dial 911. Now available from your iPhone and Android! Please provide this summary of care documentation to your next provider. Your primary care clinician is listed as HANH Jones. If you have any questions after today's visit, please call [phone].

## 2017-11-20 NOTE — PROGRESS NOTES
This note will not be viewable in [address] Subjective:     Mrs Gary Reyes presents to the office today with several weeks worth of left antecubital pain. She notes a small localized area of pain that is been present since she was in the hospital recently. She has noted no swelling, redness, warmth. There is been no reduction in range of motion of her elbow. She denies any radicular discomfort. There is been no rash. Past Medical History:   Diagnosis Date    ASCVD (arteriosclerotic cardiovascular disease) 10/1/2017    CAD (coronary artery disease)     Depression 10/6/2017    Diverticulosis of intestine without bleeding 10/6/2017    With diverticulitis    Heterozygous factor V Leiden mutation ([facility] [address] 10/6/2017    Hyperlipidemia 10/1/2017    IBS (irritable bowel syndrome) 10/6/2017    Vestibular neuronitis of both ears 10/3/2017     History reviewed. No pertinent surgical history. No Known Allergies  Current Outpatient Prescriptions   Medication Sig Dispense Refill    pantoprazole (PROTONIX) 40 mg tablet Take 40 mg by mouth daily.  atorvastatin (LIPITOR) 20 mg tablet Take 10 mg by mouth daily.        Social History     Social History    Marital status:      Spouse name: N/A    Number of children: N/A    Years of education: N/A     Social History Main Topics    Smoking status: Never Smoker    Smokeless tobacco: Never Used    Alcohol use No    Drug use: No    Sexual activity: No     Other Topics Concern    None     Social History Narrative     Family History   Problem Relation Age of Onset    No Known Problems Mother     No Known Problems Father     Sudden Death Other        Review of Systems:  GEN: no weight loss, weight gain, fatigue or night sweats  CV: no PND, orthopnea, or palpitations  Resp: no dyspnea on exertion, no cough  Abd: no nausea, vomiting or diarrhea  EXT: denies edema, claudication  Endocrine: no hair loss, excessive thirst or polyuria  Neurological ROS: no TIA or stroke symptoms  ROS otherwise negative      Objective:     Visit Vitals    /82 (BP 1 Location: Right arm, BP Patient Position: Sitting)    Pulse 76    Ht 5' 7\" (1.702 m)    Wt 130 lb 9.6 oz (59.2 kg)    SpO2 99%    BMI 20.45 kg/m2     Body mass index is 20.45 kg/(m^2). General:   alert, cooperative and no distress   Eyes: conjunctivae/sclerae clear. PERRL, EOM's intact   Mouth:  No oral lesions, no pharyngeal erythema, no exudates   Neck: Trachea midline, no thyromegaly, no bruits   Heart: S1 and S2 normal,no murmurs noted    Lungs: Clear to auscultation bilaterally, no increased work of breathing   Abdomen: Soft, nontender. Normal bowel sounds   Extremities: No edema or cyanosis   Neuro: ..alert, oriented x3,speech normal in context and clarity, cranial nerves II-XII intact,motor strength: full proximally and distally,gait: normal  reflexes: full and symmetric     Physical exam otherwise negative         Assessment/Plan:     Diagnoses and all orders for this visit:    Left arm pain    ASCVD (arteriosclerotic cardiovascular disease)    Vestibular neuronitis of both ears        Other instructions: The patient appears to have a thrombosed vein in this area potentially related to previous blood draw or IV. I have recommended that she apply heat 3-4 times a day and to take Advil or Aleve for the discomfort. Follow-up if there is no improvement    Follow-up Disposition:  Return for As previously scheduled.     Azul Escudero MD

## 2018-01-16 ENCOUNTER — OFFICE VISIT (OUTPATIENT)
Dept: INTERNAL MEDICINE CLINIC | Age: 68
End: 2018-01-16

## 2018-01-16 VITALS
OXYGEN SATURATION: 92 % | SYSTOLIC BLOOD PRESSURE: 136 MMHG | HEIGHT: 67 IN | WEIGHT: 136 LBS | DIASTOLIC BLOOD PRESSURE: 84 MMHG | BODY MASS INDEX: 21.35 KG/M2 | HEART RATE: 60 BPM

## 2018-01-16 DIAGNOSIS — G47.00 INSOMNIA, UNSPECIFIED TYPE: ICD-10-CM

## 2018-01-16 DIAGNOSIS — H81.22 VESTIBULAR NEURONITIS OF LEFT EAR: Primary | ICD-10-CM

## 2018-01-16 RX ORDER — ZOLPIDEM TARTRATE 10 MG/1
10 TABLET ORAL
Qty: 30 TAB | Refills: 0 | Status: SHIPPED | OUTPATIENT
Start: 2018-01-16 | End: 2018-02-05 | Stop reason: SDUPTHER

## 2018-01-16 NOTE — PATIENT INSTRUCTIONS
Dizziness: Care Instructions  Your Care Instructions  Dizziness is the feeling of unsteadiness or fuzziness in your head. It is different than having vertigo, which is a feeling that the room is spinning or that you are moving or falling. It is also different from lightheadedness, which is the feeling that you are about to faint. It can be hard to know what causes dizziness. Some people feel dizzy when they have migraine headaches. Sometimes bouts of flu can make you feel dizzy. Some medical conditions, such as heart problems or high blood pressure, can make you feel dizzy. Many medicines can cause dizziness, including medicines for high blood pressure, pain, or anxiety. If a medicine causes your symptoms, your doctor may recommend that you stop or change the medicine. If it is a problem with your heart, you may need medicine to help your heart work better. If there is no clear reason for your symptoms, your doctor may suggest watching and waiting for a while to see if the dizziness goes away on its own. Follow-up care is a key part of your treatment and safety. Be sure to make and go to all appointments, and call your doctor if you are having problems. It's also a good idea to know your test results and keep a list of the medicines you take. How can you care for yourself at home? · If your doctor recommends or prescribes medicine, take it exactly as directed. Call your doctor if you think you are having a problem with your medicine. · Do not drive while you feel dizzy. · Try to prevent falls. Steps you can take include:  ¨ Using nonskid mats, adding grab bars near the tub, and using night-lights. ¨ Clearing your home so that walkways are free of anything you might trip on. ¨ Letting family and friends know that you have been feeling dizzy. This will help them know how to help you. When should you call for help? Call 911 anytime you think you may need emergency care.  For example, call if:  ? · You passed out (lost consciousness). ? · You have dizziness along with symptoms of a heart attack. These may include:  ¨ Chest pain or pressure, or a strange feeling in the chest.  ¨ Sweating. ¨ Shortness of breath. ¨ Nausea or vomiting. ¨ Pain, pressure, or a strange feeling in the back, neck, jaw, or upper belly or in one or both shoulders or arms. ¨ Lightheadedness or sudden weakness. ¨ A fast or irregular heartbeat. ? · You have symptoms of a stroke. These may include:  ¨ Sudden numbness, tingling, weakness, or loss of movement in your face, arm, or leg, especially on only one side of your body. ¨ Sudden vision changes. ¨ Sudden trouble speaking. ¨ Sudden confusion or trouble understanding simple statements. ¨ Sudden problems with walking or balance. ¨ A sudden, severe headache that is different from past headaches. ?Call your doctor now or seek immediate medical care if:  ? · You feel dizzy and have a fever, headache, or ringing in your ears. ? · You have new or increased nausea and vomiting. ? · Your dizziness does not go away or comes back. ? Watch closely for changes in your health, and be sure to contact your doctor if:  ? · You do not get better as expected. Where can you learn more? Go to http://kyler-mehrdad.info/. Enter S451 in the search box to learn more about \"Dizziness: Care Instructions. \"  Current as of: March 20, 2017  Content Version: 11.4  © 6001-1073 [employer]. Care instructions adapted under license by [employer] (which disclaims liability or warranty for this information). If you have questions about a medical condition or this instruction, always ask your healthcare professional. [place] [address] any warranty or liability for your use of this information.

## 2018-01-16 NOTE — PROGRESS NOTES
Paulo Ennis is a 74 y.o. female presenting for Follow-up (3 mo fu)  . 1. Have you been to the ER, urgent care clinic since your last visit? Hospitalized since your last visit? No    2. Have you seen or consulted any other health care providers outside of the [address] [place] [address] since your last visit? Include any pap smears or colon screening. No    Fall Risk Assessment, last 12 mths 10/9/2017   Able to walk? Yes   Fall in past 12 months? No         Abuse Screening Questionnaire 10/9/2017   Do you ever feel afraid of your partner? N   Are you in a relationship with someone who physically or mentally threatens you? N   Is it safe for you to go home? Y       No flowsheet data found. There are no discontinued medications.

## 2018-01-16 NOTE — PROGRESS NOTES
This note will not be viewable in [address] Subjective: The patient returns in follow-up of her vestibular neuronitis. The patient is slowly recovering but continues to have some dizziness. In the interim she has been seen at the balance center by Dr. Steven Sal. The patient currently is on no medications for this. She has returned to driving and is had no difficulties. There have been no falls. She denies any nausea or vomiting. Past Medical History:   Diagnosis Date    ASCVD (arteriosclerotic cardiovascular disease) 10/1/2017    CAD (coronary artery disease)     Depression 10/6/2017    Diverticulosis of intestine without bleeding 10/6/2017    With diverticulitis    Heterozygous factor V Leiden mutation ([facility] [address] 10/6/2017    Hyperlipidemia 10/1/2017    IBS (irritable bowel syndrome) 10/6/2017    Insomnia 1/16/2018    Vestibular neuronitis of both ears 10/3/2017     History reviewed. No pertinent surgical history. No Known Allergies  Current Outpatient Prescriptions   Medication Sig Dispense Refill    zolpidem (AMBIEN) 10 mg tablet Take 1 Tab by mouth nightly as needed for Sleep. Max Daily Amount: 10 mg. Indications: SLEEP-ONSET INSOMNIA 30 Tab 0    atorvastatin (LIPITOR) 20 mg tablet Take 10 mg by mouth daily.        Social History     Social History    Marital status:      Spouse name: N/A    Number of children: N/A    Years of education: N/A     Social History Main Topics    Smoking status: Never Smoker    Smokeless tobacco: Never Used    Alcohol use No    Drug use: No    Sexual activity: No     Other Topics Concern    None     Social History Narrative     Family History   Problem Relation Age of Onset    No Known Problems Mother     No Known Problems Father     Sudden Death Other        Review of Systems:  GEN: no weight loss, weight gain, fatigue or night sweats  CV: no PND, orthopnea, or palpitations  Resp: no dyspnea on exertion, no cough  Abd: no nausea, vomiting or diarrhea  EXT: denies edema, claudication  Endocrine: no hair loss, excessive thirst or polyuria  Neurological ROS: no TIA or stroke symptoms  ROS otherwise negative      Objective:     Visit Vitals    /84    Pulse 60    Ht 5' 7\" (1.702 m)    Wt 136 lb (61.7 kg)    SpO2 92%    BMI 21.3 kg/m2     Body mass index is 21.3 kg/(m^2). General:   alert, cooperative and no distress   Eyes: conjunctivae/sclerae clear. PERRL, EOM's intact   Mouth:  No oral lesions, no pharyngeal erythema, no exudates   Neck: Trachea midline, no thyromegaly, no bruits   Heart: S1 and S2 normal,no murmurs noted    Lungs: Clear to auscultation bilaterally, no increased work of breathing   Abdomen: Soft, nontender. Normal bowel sounds   Extremities: No edema or cyanosis   Neuro: ..alert, oriented x3,speech normal in context and clarity, cranial nerves II-XII intact,motor strength: full proximally and distally,gait: normal  reflexes: full and symmetric     Physical exam otherwise negative         Assessment/Plan:     Diagnoses and all orders for this visit:    Vestibular neuronitis of left ear    Insomnia, unspecified type  -     zolpidem (AMBIEN) 10 mg tablet; Take 1 Tab by mouth nightly as needed for Sleep. Max Daily Amount: 10 mg. Indications: SLEEP-ONSET INSOMNIA, Print, Disp-30 Tab, R-0        Other instructions: The patient is slowly recovering and continues to have some intermittent balance issues. I have recommended that she continue to increase her physical activities especially those where she is challenged by balance. I have asked her to be safe in regards to her activities such that she does not fall. She is having some intermittent insomnia especially when she travels and we will refill Ambien which she has taken in the past.    Follow-up 6 months    Follow-up Disposition:  Return in about 6 months (around 7/16/2018).     Kelvin Keen MD

## 2018-01-16 NOTE — MR AVS SNAPSHOT
[address] [facility] [address] [id number] [phone] Patient: Isabelle Martinez MRN: [id number] QHO:[id number] Visit Information Date & Time Provider Department Dept. Phone Encounter #  
 1/16/2018  1:30 PM Mary Kate Tinajero MD [address] [id number] Follow-up Instructions Return in about 6 months (around 7/16/2018). Upcoming Health Maintenance Date Due Hepatitis C Screening 1950 DTaP/Tdap/Td series (1 - Tdap) 9/26/1973 BREAST CANCER SCRN MAMMOGRAM 9/26/2002 FOBT Q 1 YEAR AGE 50-75 9/26/2002 ZOSTER VACCINE AGE 60> 7/26/2012 Influenza Age 5 to Adult 8/1/2017 GLAUCOMA SCREENING Q2Y 9/26/2017 OSTEOPOROSIS SCREENING (DEXA) 9/26/2017 Pneumococcal 65+ Low/Medium Risk (1 of 2 - PCV13) 9/26/2017 MEDICARE YEARLY EXAM 9/26/2017 Allergies as of 1/16/2018  Review Complete On: 1/16/2018 By: Mary Kate Tinajero MD  
 No Known Allergies Current Immunizations  Never Reviewed No immunizations on file. Not reviewed this visit You Were Diagnosed With   
  
 Codes Comments Vestibular neuronitis of left ear    -  Primary ICD-10-CM: H81.22 
ICD-9-CM: 386.12 Insomnia, unspecified type     ICD-10-CM: G47.00 ICD-9-CM: 780.52 Vitals BP Pulse Height(growth percentile) Weight(growth percentile) SpO2 BMI  
 136/84 60 5' 7\" (1.702 m) 136 lb (61.7 kg) 92% 21.3 kg/m2 OB Status Smoking Status Postmenopausal Never Smoker Vitals History BMI and BSA Data Body Mass Index Body Surface Area  
 21.3 kg/m 2 1.71 m 2 Preferred Pharmacy Pharmacy Name Phone [phone], [address] AT [place] [address] [phone] Your Updated Medication List  
  
   
This list is accurate as of: 1/16/18  2:15 PM.  Always use your most recent med list.  
  
  
  
  
 LIPITOR 20 mg tablet Generic drug:  atorvastatin Take 10 mg by mouth daily. zolpidem 10 mg tablet Commonly known as:  AMBIEN Take 1 Tab by mouth nightly as needed for Sleep. Max Daily Amount: 10 mg. Indications: SLEEP-ONSET INSOMNIA Prescriptions Printed Refills  
 zolpidem (AMBIEN) 10 mg tablet 0 Sig: Take 1 Tab by mouth nightly as needed for Sleep. Max Daily Amount: 10 mg. Indications: SLEEP-ONSET INSOMNIA Class: Print Route: Oral  
  
Follow-up Instructions Return in about 6 months (around 7/16/2018). Patient Instructions Dizziness: Care Instructions Your Care Instructions Dizziness is the feeling of unsteadiness or fuzziness in your head. It is different than having vertigo, which is a feeling that the room is spinning or that you are moving or falling. It is also different from lightheadedness, which is the feeling that you are about to faint. It can be hard to know what causes dizziness. Some people feel dizzy when they have migraine headaches. Sometimes bouts of flu can make you feel dizzy. Some medical conditions, such as heart problems or high blood pressure, can make you feel dizzy. Many medicines can cause dizziness, including medicines for high blood pressure, pain, or anxiety. If a medicine causes your symptoms, your doctor may recommend that you stop or change the medicine. If it is a problem with your heart, you may need medicine to help your heart work better. If there is no clear reason for your symptoms, your doctor may suggest watching and waiting for a while to see if the dizziness goes away on its own. Follow-up care is a key part of your treatment and safety. Be sure to make and go to all appointments, and call your doctor if you are having problems. It's also a good idea to know your test results and keep a list of the medicines you take. How can you care for yourself at home? · If your doctor recommends or prescribes medicine, take it exactly as directed. Call your doctor if you think you are having a problem with your medicine. · Do not drive while you feel dizzy. · Try to prevent falls. Steps you can take include: ¨ Using nonskid mats, adding grab bars near the tub, and using night-lights. ¨ Clearing your home so that walkways are free of anything you might trip on. ¨ Letting family and friends know that you have been feeling dizzy. This will help them know how to help you. When should you call for help? Call 911 anytime you think you may need emergency care. For example, call if: 
? · You passed out (lost consciousness). ? · You have dizziness along with symptoms of a heart attack. These may include: ¨ Chest pain or pressure, or a strange feeling in the chest. 
¨ Sweating. ¨ Shortness of breath. ¨ Nausea or vomiting. ¨ Pain, pressure, or a strange feeling in the back, neck, jaw, or upper belly or in one or both shoulders or arms. ¨ Lightheadedness or sudden weakness. ¨ A fast or irregular heartbeat. ? · You have symptoms of a stroke. These may include: 
¨ Sudden numbness, tingling, weakness, or loss of movement in your face, arm, or leg, especially on only one side of your body. ¨ Sudden vision changes. ¨ Sudden trouble speaking. ¨ Sudden confusion or trouble understanding simple statements. ¨ Sudden problems with walking or balance. ¨ A sudden, severe headache that is different from past headaches. ?Call your doctor now or seek immediate medical care if: 
? · You feel dizzy and have a fever, headache, or ringing in your ears. ? · You have new or increased nausea and vomiting. ? · Your dizziness does not go away or comes back. ? Watch closely for changes in your health, and be sure to contact your doctor if: 
? · You do not get better as expected. Where can you learn more? Go to http://kyler-mehrdad.info/. Enter A969 in the search box to learn more about \"Dizziness: Care Instructions. \" Current as of: March 20, 2017 Content Version: 11.4 © 9289-5596 [employer]. Care instructions adapted under license by [employer] (which disclaims liability or warranty for this information). If you have questions about a medical condition or this instruction, always ask your healthcare professional. [address] any warranty or liability for your use of this information. Introducing \[facility]\"" & HEALTH SERVICES! Dear Kevin Lyons: Thank you for requesting a MusicAll account. Our records indicate that you already have an active MusicAll account. You can access your account anytime at [email or web address] Did you know that you can access your hospital and ER discharge instructions at any time in MusicAll? You can also review all of your test results from your hospital stay or ER visit. Additional Information If you have questions, please visit the Frequently Asked Questions section of the MusicAll website at [email or web address] Remember, MusicAll is NOT to be used for urgent needs. For medical emergencies, dial 911. Now available from your iPhone and Android! Please provide this summary of care documentation to your next provider. Your primary care clinician is listed as HANH Jones. If you have any questions after today's visit, please call [phone].

## 2018-02-05 ENCOUNTER — OFFICE VISIT (OUTPATIENT)
Dept: INTERNAL MEDICINE CLINIC | Age: 68
End: 2018-02-05

## 2018-02-05 VITALS
SYSTOLIC BLOOD PRESSURE: 116 MMHG | BODY MASS INDEX: 20.44 KG/M2 | OXYGEN SATURATION: 99 % | HEART RATE: 58 BPM | WEIGHT: 130.2 LBS | HEIGHT: 67 IN | DIASTOLIC BLOOD PRESSURE: 78 MMHG

## 2018-02-05 DIAGNOSIS — G47.00 INSOMNIA, UNSPECIFIED TYPE: ICD-10-CM

## 2018-02-05 DIAGNOSIS — J11.1 INFLUENZA: Primary | ICD-10-CM

## 2018-02-05 RX ORDER — ZOLPIDEM TARTRATE 10 MG/1
TABLET ORAL
Qty: 30 TAB | Refills: 0 | Status: ON HOLD | OUTPATIENT
Start: 2018-02-05 | End: 2020-07-21

## 2018-02-05 RX ORDER — OSELTAMIVIR PHOSPHATE 75 MG/1
75 CAPSULE ORAL 2 TIMES DAILY
Qty: 10 CAP | Refills: 0 | Status: SHIPPED | OUTPATIENT
Start: 2018-02-05 | End: 2018-02-10

## 2018-02-05 NOTE — PROGRESS NOTES
This note will not be viewable in [address] Subjective: The patient presents to the office today with 3 days worth of  body aches, headache, runny nose, scratchy throat and a dry hacking cough. The patient denies any chest pain, shortness of breath or wheezing. There has been no neck stiffness or rash. She notes that the sore throat is her most predominant symptom but it is not as bad as bacterial infections that she has had in the past.  She has been using throat lozenges for this. The patient did not receive an influenza vaccination this year. Past Medical History:   Diagnosis Date    ASCVD (arteriosclerotic cardiovascular disease) 10/1/2017    CAD (coronary artery disease)     Depression 10/6/2017    Diverticulosis of intestine without bleeding 10/6/2017    With diverticulitis    Heterozygous factor V Leiden mutation ([facility] [address] 10/6/2017    Hyperlipidemia 10/1/2017    IBS (irritable bowel syndrome) 10/6/2017    Insomnia 1/16/2018    Vestibular neuronitis of both ears 10/3/2017     History reviewed. No pertinent surgical history. No Known Allergies  Current Outpatient Prescriptions   Medication Sig Dispense Refill    zolpidem (AMBIEN) 10 mg tablet TAKE 1 TABLET BY MOUTH NIGHTLY AS NEEDED FOR SLEEP 30 Tab 0    oseltamivir (TAMIFLU) 75 mg capsule Take 1 Cap by mouth two (2) times a day for 5 days. 10 Cap 0    atorvastatin (LIPITOR) 20 mg tablet Take 10 mg by mouth daily.        Social History     Social History    Marital status:      Spouse name: N/A    Number of children: N/A    Years of education: N/A     Social History Main Topics    Smoking status: Never Smoker    Smokeless tobacco: Never Used    Alcohol use No    Drug use: No    Sexual activity: No     Other Topics Concern    None     Social History Narrative     Family History   Problem Relation Age of Onset    No Known Problems Mother     No Known Problems Father     Sudden Death Other        Review of Systems:  GEN: Positive for fevers and chills  ENT: Positive for runny nose and scratchy throat  CV: no PND, orthopnea, or palpitations  Resp: Positive for dry hacking cough  Abd: no nausea, vomiting or diarrhea  EXT: denies edema, claudication  Neurological ROS: no TIA or stroke symptoms  ROS otherwise negative      Objective:     Visit Vitals    /78 (BP 1 Location: Right arm, BP Patient Position: Sitting)    Pulse (!) 58    Ht 5' 7\" (1.702 m)    Wt 130 lb 3.2 oz (59.1 kg)    SpO2 99%    BMI 20.39 kg/m2     Body mass index is 20.39 kg/(m^2). General:   alert, cooperative and no distress   Eyes: conjunctivae/sclerae clear. PERRL, EOM's intact   Mouth:  No oral lesions. Minimal pharyngeal erythema is present   Neck: Trachea midline, no thyromegaly, no bruits. No adenopathy is palpable   Heart: S1 and S2 normal,no murmurs noted    Lungs: Clear to auscultation bilaterally, no increased work of breathing   Abdomen: Soft, nontender. Normal bowel sounds   Extremities: No edema or cyanosis   Neuro: ..alert, oriented x3,speech normal in context and clarity, cranial nerves II-XII intact,motor strength: full proximally and distally,gait: normal  reflexes: full and symmetric     Physical exam otherwise negative         Assessment/Plan:     Diagnoses and all orders for this visit:    Influenza  -     oseltamivir (TAMIFLU) 75 mg capsule; Take 1 Cap by mouth two (2) times a day for 5 days. , Normal, Disp-10 Cap, R-0        Other instructions:   Tamiflu for 5 days as directed    Bedrest, increased p.o. fluids, and Tylenol for feverishness    Cough suppressant of choice    Patient to remain home from work/school as long as they are febrile and or feeling bad    Follow-up should signs of secondary infection develop    Follow-up Disposition: Not on Petrona Tabor MD

## 2018-02-05 NOTE — PROGRESS NOTES
Mahendra Mccoy is a 74 y.o. female presenting for Flu  .     1. Have you been to the ER, urgent care clinic since your last visit? Hospitalized since your last visit? No    2. Have you seen or consulted any other health care providers outside of the [address] [place] [address] since your last visit? Include any pap smears or colon screening. Yes When: 1-24-18 Where: Dr Danitza Pastrana Reason for visit: balance issue. Fall Risk Assessment, last 12 mths 10/9/2017   Able to walk? Yes   Fall in past 12 months? No         Abuse Screening Questionnaire 10/9/2017   Do you ever feel afraid of your partner? N   Are you in a relationship with someone who physically or mentally threatens you? N   Is it safe for you to go home? Y       No flowsheet data found. There are no discontinued medications.

## 2018-02-05 NOTE — MR AVS SNAPSHOT
[facility] 
 
 
 [address] [id number] [phone] Patient: Gay Carver MRN: [id number] Visit Information Date & Time Provider Department Dept. Phone Encounter #  
 2/5/2018  9:50 AM Baldo Simon MD [facility] [id number] Your Appointments 7/17/2018  1:30 PM  
FOLLOW UP 10 with aBldo Simon MD  
[address] ([facility]) Appt Note: 6 mo fu  
 [address] [address] [address] [address] Upcoming Health Maintenance Date Due Hepatitis C Screening 1950 DTaP/Tdap/Td series (1 - Tdap) 9/26/1973 BREAST CANCER SCRN MAMMOGRAM 9/26/2002 FOBT Q 1 YEAR AGE 50-75 9/26/2002 ZOSTER VACCINE AGE 60> 7/26/2012 Influenza Age 5 to Adult 8/1/2017 GLAUCOMA SCREENING Q2Y 9/26/2017 OSTEOPOROSIS SCREENING (DEXA) 9/26/2017 Pneumococcal 65+ Low/Medium Risk (1 of 2 - PCV13) 9/26/2017 MEDICARE YEARLY EXAM 9/26/2017 Allergies as of 2/5/2018  Review Complete On: 2/5/2018 By: Baldo Simon MD  
 No Known Allergies Current Immunizations  Never Reviewed No immunizations on file. Not reviewed this visit You Were Diagnosed With   
  
 Codes Comments Influenza    -  Primary ICD-10-CM: J11.1 ICD-9-CM: 487. 1 Vitals BP Pulse Height(growth percentile) Weight(growth percentile) SpO2 BMI  
 116/78 (BP 1 Location: Right arm, BP Patient Position: Sitting) (!) 58 5' 7\" (1.702 m) 130 lb 3.2 oz (59.1 kg) 99% 20.39 kg/m2 OB Status Smoking Status Postmenopausal Never Smoker BMI and BSA Data Body Mass Index Body Surface Area  
 20.39 kg/m 2 1.67 m 2 Preferred [facility] Name Phone  [facility] [phone] Your Updated Medication List  
  
   
This list is accurate as of: 2/5/18 10:11 AM.  Always use your most recent med list.  
  
  
  
  
 LIPITOR 20 mg tablet Generic drug:  atorvastatin Take 10 mg by mouth daily. oseltamivir 75 mg capsule Commonly known as:  TAMIFLU Take 1 Cap by mouth two (2) times a day for 5 days. zolpidem 10 mg tablet Commonly known as:  AMBIEN  
TAKE 1 TABLET BY MOUTH NIGHTLY AS NEEDED FOR SLEEP Prescriptions Sent to Pharmacy Refills  
 oseltamivir (TAMIFLU) 75 mg capsule 0 Sig: Take 1 Cap by mouth two (2) times a day for 5 days. Class: Normal  
 Pharmacy: [facility] [address] SAHRA [place] [address] [place], [address] Ph #: [phone] Route: Oral  
  
Patient Instructions Influenza (Flu): Care Instructions Your Care Instructions Influenza (flu) is an infection in the lungs and breathing passages. It is caused by the influenza virus. There are different strains, or types, of the flu virus from year to year. Unlike the common cold, the flu comes on suddenly and the symptoms, such as a cough, congestion, fever, chills, fatigue, aches, and pains, are more severe. These symptoms may last up to 10 days. Although the flu can make you feel very sick, it usually doesn't cause serious health problems. Home treatment is usually all you need for flu symptoms. But your doctor may prescribe antiviral medicine to prevent other health problems, such as pneumonia, from developing. Older people and those who have a long-term health condition, such as lung disease, are most at risk for having pneumonia or other health problems. Follow-up care is a key part of your treatment and safety. Be sure to make and go to all appointments, and call your doctor if you are having problems. It's also a good idea to know your test results and keep a list of the medicines you take. How can you care for yourself at home? · Get plenty of rest. 
· Drink plenty of fluids, enough so that your urine is light yellow or clear like water. If you have kidney, heart, or liver disease and have to limit fluids, talk with your doctor before you increase the amount of fluids you drink. · Take an over-the-counter pain medicine if needed, such as acetaminophen (Tylenol), ibuprofen (Advil, Motrin), or naproxen (Aleve), to relieve fever, headache, and muscle aches. Read and follow all instructions on the label. No one younger than 20 should take aspirin. It has been linked to Reye syndrome, a serious illness. · Do not smoke. Smoking can make the flu worse. If you need help quitting, talk to your doctor about stop-smoking programs and medicines. These can increase your chances of quitting for good. · Breathe moist air from a hot shower or from a sink filled with hot water to help clear a stuffy nose. · Before you use cough and cold medicines, check the label. These medicines may not be safe for young children or for people with certain health problems. · If the skin around your nose and lips becomes sore, put some petroleum jelly on the area. · To ease coughing: ¨ Drink fluids to soothe a scratchy throat. ¨ Suck on cough drops or plain hard candy. ¨ Take an over-the-counter cough medicine that contains dextromethorphan to help you get some sleep. Read and follow all instructions on the label. ¨ Raise your head at night with an extra pillow. This may help you rest if coughing keeps you awake. · Take any prescribed medicine exactly as directed. Call your doctor if you think you are having a problem with your medicine. To avoid spreading the flu · Wash your hands regularly, and keep your hands away from your face. · Stay home from school, work, and other public places until you are feeling better and your fever has been gone for at least 24 hours. The fever needs to have gone away on its own without the help of medicine. · Ask people living with you to talk to their doctors about preventing the flu. They may get antiviral medicine to keep from getting the flu from you. · To prevent the flu in the future, get a flu vaccine every fall. Encourage people living with you to get the vaccine. · Cover your mouth when you cough or sneeze. When should you call for help? Call 911 anytime you think you may need emergency care. For example, call if: 
? · You have severe trouble breathing. ?Call your doctor now or seek immediate medical care if: 
? · You have new or worse trouble breathing. ? · You seem to be getting much sicker. ? · You feel very sleepy or confused. ? · You have a new or higher fever. ? · You get a new rash. ? Watch closely for changes in your health, and be sure to contact your doctor if: 
? · You begin to get better and then get worse. ? · You are not getting better after 1 week. Where can you learn more? Go to http://kyler-mehrdad.info/. Enter I701 in the search box to learn more about \"Influenza (Flu): Care Instructions. \" Current as of: May 12, 2017 Content Version: 11.4 © 3800-0639 COPsync. Care instructions adapted under license by Topadmit (which disclaims liability or warranty for this information). If you have questions about a medical condition or this instruction, always ask your healthcare professional. [address] any warranty or liability for your use of this information. Introducing \[facility] [place] [facility]\"" & HEALTH SERVICES! Dear Gustavo Castro: Thank you for requesting a [email or web address] account. Our records indicate that you already have an active [email or web address] account. You can access your account anytime at [email or web address] Did you know that you can access your hospital and ER discharge instructions at any time in [email or web address]? You can also review all of your test results from your hospital stay or ER visit. Additional Information If you have questions, please visit the Frequently Asked Questions section of the Guestyt website at [email or web address] Remember, Intelligent Energy is NOT to be used for urgent needs. For medical emergencies, dial 911. Now available from your iPhone and Android! Please provide this summary of care documentation to your next provider. Your primary care clinician is listed as HANH Jones. If you have any questions after today's visit, please call [phone].

## 2018-02-05 NOTE — PATIENT INSTRUCTIONS

## 2018-07-18 ENCOUNTER — OFFICE VISIT (OUTPATIENT)
Dept: INTERNAL MEDICINE CLINIC | Age: 68
End: 2018-07-18

## 2018-07-18 VITALS
WEIGHT: 126 LBS | SYSTOLIC BLOOD PRESSURE: 118 MMHG | HEIGHT: 67 IN | HEART RATE: 64 BPM | DIASTOLIC BLOOD PRESSURE: 70 MMHG | BODY MASS INDEX: 19.78 KG/M2 | OXYGEN SATURATION: 98 %

## 2018-07-18 DIAGNOSIS — G47.00 INSOMNIA, UNSPECIFIED TYPE: ICD-10-CM

## 2018-07-18 DIAGNOSIS — R00.2 PALPITATIONS: ICD-10-CM

## 2018-07-18 DIAGNOSIS — H81.22 VESTIBULAR NEURONITIS OF LEFT EAR: Primary | ICD-10-CM

## 2018-07-18 LAB
ERYTHROCYTE [SEDIMENTATION RATE] IN BLOOD: 2 MM/HR (ref 0–20)
GRAN# POC: 3.4 K/UL (ref 2–7.8)
GRAN% POC: 66.7 % (ref 37–92)
HCT VFR BLD CALC: 40.7 % (ref 37–51)
HGB BLD-MCNC: 13.6 G/DL (ref 12–18)
LY# POC: 1.4 K/UL (ref 0.6–4.1)
LY% POC: 28.5 % (ref 10–58.5)
MCH RBC QN: 30.4 PG (ref 26–32)
MCHC RBC-ENTMCNC: 33.4 G/DL (ref 30–36)
MCV RBC: 91 FL (ref 80–97)
MID #, POC: 0.2 K/UL (ref 0–1.8)
MID% POC: 4.8 % (ref 0.1–24)
PLATELET # BLD: 264 K/UL (ref 140–440)
RBC # BLD: 4.48 M/UL (ref 4.2–6.3)
WBC # BLD: 5 K/UL (ref 4.1–10.9)

## 2018-07-18 NOTE — PROGRESS NOTES
This note will not be viewable in [address] Subjective:     Mrs. Lorena Gray presents to the office today with spells that have been occurring over the last 2-3 weeks. The patient has known vestibular neuronitis and was seen by Dr. Yael Zhang for this in the past and did see her in follow-up earlier today. The patient notes that on 6/30 she had the development of nausea, sweatiness, feeling tight and weak. This lasted for several hours and then resolved she had no chest pain or syncope. Approximately 5 days ago she felt nauseated had some retching, headache, fatigue, exhaustion, felt tense and side and noted that her head felt \"heavy\" and she had some sweats. She had no neurological dysfunction. Today she was seen by Dr. Janet New and was noted to have nystagmus and was felt to have vertigo and given Epley maneuvers and a cervical collar. The patient notes that these attacks occur without provocation and occur suddenly and only last for short period of time time. She will go days without any of these. She denies any anxiety and is had no palpitations or chest pains. Past Medical History:   Diagnosis Date    ASCVD (arteriosclerotic cardiovascular disease) 10/1/2017    CAD (coronary artery disease)     Depression 10/6/2017    Diverticulosis of intestine without bleeding 10/6/2017    With diverticulitis    Heterozygous factor V Leiden mutation ([facility] [address] 10/6/2017    Hyperlipidemia 10/1/2017    IBS (irritable bowel syndrome) 10/6/2017    Insomnia 1/16/2018    Vestibular neuronitis of both ears 10/3/2017     History reviewed. No pertinent surgical history. No Known Allergies  Current Outpatient Prescriptions   Medication Sig Dispense Refill    zolpidem (AMBIEN) 10 mg tablet TAKE 1 TABLET BY MOUTH NIGHTLY AS NEEDED FOR SLEEP 30 Tab 0    atorvastatin (LIPITOR) 20 mg tablet Take 10 mg by mouth daily.        Social History     Social History    Marital status:      Spouse name: N/A    Number of children: N/A    Years of education: N/A     Social History Main Topics    Smoking status: Never Smoker    Smokeless tobacco: Never Used    Alcohol use No    Drug use: No    Sexual activity: No     Other Topics Concern    None     Social History Narrative     Family History   Problem Relation Age of Onset    No Known Problems Mother     No Known Problems Father     Sudden Death Other        Review of Systems:  GEN: no weight loss, weight gain, fatigue or night sweats  CV: no PND, orthopnea, or palpitations  Resp: no dyspnea on exertion, no cough  Abd: no nausea, vomiting or diarrhea  EXT: denies edema, claudication  Endocrine: no hair loss, excessive thirst or polyuria  Neurological ROS: no TIA or stroke symptoms  ROS otherwise negative      Objective:     Visit Vitals    /70 (BP 1 Location: Left arm, BP Patient Position: Sitting)    Pulse 64    Ht 5' 7\" (1.702 m)    Wt 126 lb (57.2 kg)    SpO2 98%    BMI 19.73 kg/m2     Body mass index is 19.73 kg/(m^2). General:   alert, cooperative and no distress   Eyes: conjunctivae/sclerae clear. PERRL, EOM's intact   Mouth:  No oral lesions, no pharyngeal erythema, no exudates   Neck: Trachea midline, no thyromegaly, no bruits   Heart: S1 and S2 normal,no murmurs noted    Lungs: Clear to auscultation bilaterally, no increased work of breathing   Abdomen: Soft, nontender.   Normal bowel sounds   Extremities: No edema or cyanosis   Neuro: ..alert, oriented x3,speech normal in context and clarity, cranial nerves II-XII intact,motor strength: full proximally and distally,gait: normal  reflexes: full and symmetric     Physical exam otherwise negative         Assessment/Plan:     Diagnoses and all orders for this visit:    Vestibular neuronitis of left ear  -     AMB POC COMPLETE CBC,AUTOMATED ENTER  -     COLLECTION VENOUS BLOOD,VENIPUNCTURE  -     METABOLIC PANEL, COMPREHENSIVE  -     SED RATE (ESR)  -     C REACTIVE PROTEIN, QT    Insomnia, unspecified type    Palpitations  -     TSH 3RD GENERATION        Other instructions:   Her examination today is benign and symptoms are possibly consistent with her vestibular neuronitis. She should have some laboratory studies done to make sure that there are no electrolyte or metabolic abnormalities present. I doubt based on symptoms that this is cardiac related. She is somewhat anxious and panic attacks would be a possibility. She should continue follow-up with her balance Dr. and I have recommended that she see her cardiologist for follow-up. Follow-up here pending results and response to the above    Follow-up Disposition:  Return for As previously scheduled.     Ba Duncan MD

## 2018-07-18 NOTE — PROGRESS NOTES
Mayo Mckinnon is a 74 y.o. female presenting for Cholesterol Problem (6 mo fu) and Dizziness  . 1. Have you been to the ER, urgent care clinic since your last visit? Hospitalized since your last visit? No    2. Have you seen or consulted any other health care providers outside of the [facility] since your last visit? Include any pap smears or colon screening. Yes When: Today Where: Dr Suzette Orellana Reason for visit: balance/dizzy. Fall Risk Assessment, last 12 mths 10/9/2017   Able to walk? Yes   Fall in past 12 months? No         Abuse Screening Questionnaire 10/9/2017   Do you ever feel afraid of your partner? N   Are you in a relationship with someone who physically or mentally threatens you? N   Is it safe for you to go home? Y       No flowsheet data found. There are no discontinued medications.

## 2018-07-18 NOTE — PATIENT INSTRUCTIONS
Epley Maneuver at Home for Vertigo: Exercises Your Care Instructions Vertigo is a spinning or whirling sensation when you move your head. Your doctor may have moved you in different positions to help your vertigo get better faster. This is called the Epley maneuver. Your doctor also may have asked you to do these exercises at home. Do the exercises as often as your doctor recommends. If your vertigo is getting worse, your doctor may have you change the exercise or stop it. Step 1 Step 1 1. Sit on the edge of a bed or sofa. Step 2 1. Turn your head 45 degrees in the direction your doctor told you to. This should be toward the ear that causes the most vertigo for you. In this picture, the woman is turning toward her left ear. Step 3 1. Tilt yourself backward until you are lying on your back. Your head should still be at a 45-degree turn. Your head should be about midway between looking straight ahead and looking out to your side. Hold for 30 seconds. If you have vertigo, stay in this position until it stops. Step 4 1. Turn your head 90 degrees toward the ear that has the least vertigo. In this picture, the woman is turning to the right because she has vertigo on her left side. The point of your chin should be raised and over your shoulder. Hold for 30 seconds. Step 5 1. Roll onto the side with the least vertigo. You should now be looking at the floor. Hold for 30 seconds. Follow-up care is a key part of your treatment and safety. Be sure to make and go to all appointments, and call your doctor if you are having problems. It's also a good idea to know your test results and keep a list of the medicines you take. Where can you learn more? Go to http://kyler-mehrdad.info/. Enter 470 [id number] in the search box to learn more about \"Epley Maneuver at Home for Vertigo: Exercises. \" Current as of: October 9, 2017 Content Version: 11.7 © 0051-9303 Spaceport.io, [facility].  Care instructions adapted under license by Trending Taste (which disclaims liability or warranty for this information). If you have questions about a medical condition or this instruction, always ask your healthcare professional. [address] any warranty or liability for your use of this information.

## 2018-07-18 NOTE — MR AVS SNAPSHOT
[address] [place] [address] [address] [address] [phone] Patient: Rollo Bumpers MRN: [id number] Visit Information Date & Time Provider Department Dept. Phone Encounter #  
 7/18/2018  3:00 PM Юлия aVlverde MD [facility] [phone] [id number] Follow-up Instructions Return for As previously scheduled. Follow-up and Disposition History Upcoming Health Maintenance Date Due Hepatitis C Screening 1950 DTaP/Tdap/Td series (1 - Tdap) 9/26/1973 BREAST CANCER SCRN MAMMOGRAM 9/26/2002 FOBT Q 1 YEAR AGE 50-75 9/26/2002 ZOSTER VACCINE AGE 60> 7/26/2012 GLAUCOMA SCREENING Q2Y 9/26/2017 Bone Densitometry (Dexa) Screening 9/26/2017 Pneumococcal 65+ Low/Medium Risk (1 of 2 - PCV13) 9/26/2017 MEDICARE YEARLY EXAM 3/14/2018 Influenza Age 5 to Adult 8/1/2018 Allergies as of 7/18/2018  Review Complete On: 7/18/2018 By: Юлия Valverde MD  
 No Known Allergies Current Immunizations  Never Reviewed No immunizations on file. Not reviewed this visit You Were Diagnosed With   
  
 Codes Comments Vestibular neuronitis of left ear    -  Primary ICD-10-CM: H81.22 
ICD-9-CM: 386.12 Insomnia, unspecified type     ICD-10-CM: G47.00 ICD-9-CM: 780.52 Palpitations     ICD-10-CM: R00.2 ICD-9-CM: 785.1 Vitals BP Pulse Height(growth percentile) Weight(growth percentile) SpO2 BMI  
 118/70 (BP 1 Location: Left arm, BP Patient Position: Sitting) 64 5' 7\" (1.702 m) 126 lb (57.2 kg) 98% 19.73 kg/m2 OB Status Smoking Status Postmenopausal Never Smoker Vitals History BMI and BSA Data Body Mass Index Body Surface Area  
 19.73 kg/m 2 1.64 m 2 Preferred Pharmacy Pharmacy Name Phone [facility] [address], [address] [place] [address] Avenue [phone] Your Updated Medication List  
  
   
This list is accurate as of 7/18/18  3:56 PM.  Always use your most recent med list.  
  
  
  
  
 LIPITOR 20 mg tablet Generic drug:  atorvastatin Take 10 mg by mouth daily. zolpidem 10 mg tablet Commonly known as:  AMBIEN  
TAKE 1 TABLET BY MOUTH NIGHTLY AS NEEDED FOR SLEEP We Performed the Following AMB POC COMPLETE CBC,AUTOMATED ENTER [id number] CPT(R)] AMB POC SEDIMENTATION RATE, ERYTHROCYTE; NON AUTO [19502 CPT(R)] C REACTIVE PROTEIN, QT [74110 CPT(R)] COLLECTION VENOUS BLOOD,VENIPUNCTURE [id number] CPT(R)] METABOLIC PANEL, COMPREHENSIVE [87096 CPT(R)] TSH 3RD GENERATION [96966 CPT(R)] Follow-up Instructions Return for As previously scheduled. Patient Instructions Epley Maneuver at Home for Vertigo: Exercises Your Care Instructions Vertigo is a spinning or whirling sensation when you move your head. Your doctor may have moved you in different positions to help your vertigo get better faster. This is called the Epley maneuver. Your doctor also may have asked you to do these exercises at home. Do the exercises as often as your doctor recommends. If your vertigo is getting worse, your doctor may have you change the exercise or stop it. Step 1 Step 1 1. Sit on the edge of a bed or sofa. Step 2 1. Turn your head 45 degrees in the direction your doctor told you to. This should be toward the ear that causes the most vertigo for you. In this picture, the woman is turning toward her left ear. Step 3 1. Tilt yourself backward until you are lying on your back. Your head should still be at a 45-degree turn. Your head should be about midway between looking straight ahead and looking out to your side. Hold for 30 seconds. If you have vertigo, stay in this position until it stops. Step 4 1. Turn your head 90 degrees toward the ear that has the least vertigo.  In this picture, the woman is turning to the right because she has vertigo on her left side. The point of your chin should be raised and over your shoulder. Hold for 30 seconds. Step 5 1. Roll onto the side with the least vertigo. You should now be looking at the floor. Hold for 30 seconds. Follow-up care is a key part of your treatment and safety. Be sure to make and go to all appointments, and call your doctor if you are having problems. It's also a good idea to know your test results and keep a list of the medicines you take. Where can you learn more? Go to http://kyler-mehrdad.info/. Enter [id number] in the search box to learn more about \"Epley Maneuver at Home for Vertigo: Exercises. \" Current as of: October 9, 2017 Content Version: 11.7 © 3822-3415 [employer]. Care instructions adapted under license by FuelFilm (which disclaims liability or warranty for this information). If you have questions about a medical condition or this instruction, always ask your healthcare professional. [place] [address] any warranty or liability for your use of this information. Patient Instructions History Introducing [place] [facility] & [facility] SERVICES! Dear Diana Hairston: Thank you for requesting a Urbful account. Our records indicate that you already have an active Urbful account. You can access your account anytime at [email or web address] Did you know that you can access your hospital and ER discharge instructions at any time in Urbful? You can also review all of your test results from your hospital stay or ER visit. Additional Information If you have questions, please visit the Frequently Asked Questions section of the Urbful website at [email or web address] Remember, Urbful is NOT to be used for urgent needs. For medical emergencies, dial 911. Now available from your iPhone and Android! Please provide this summary of care documentation to your next provider. Your primary care clinician is listed as HANH Jones. If you have any questions after today's visit, please call [phone].

## 2018-07-19 LAB
ALBUMIN SERPL-MCNC: 4.8 G/DL (ref 3.6–4.8)
ALBUMIN/GLOB SERPL: 2.1 {RATIO} (ref 1.2–2.2)
ALP SERPL-CCNC: 60 IU/L (ref 39–117)
ALT SERPL-CCNC: 18 IU/L (ref 0–32)
AST SERPL-CCNC: 20 IU/L (ref 0–40)
BILIRUB SERPL-MCNC: 0.5 MG/DL (ref 0–1.2)
BUN SERPL-MCNC: 19 MG/DL (ref 8–27)
BUN/CREAT SERPL: 20 (ref 12–28)
CALCIUM SERPL-MCNC: 9.9 MG/DL (ref 8.7–10.3)
CHLORIDE SERPL-SCNC: 102 MMOL/L (ref 96–106)
CO2 SERPL-SCNC: 26 MMOL/L (ref 20–29)
CREAT SERPL-MCNC: 0.97 MG/DL (ref 0.57–1)
CRP SERPL-MCNC: <0.3 MG/L (ref 0–4.9)
GLOBULIN SER CALC-MCNC: 2.3 G/DL (ref 1.5–4.5)
GLUCOSE SERPL-MCNC: 106 MG/DL (ref 65–99)
POTASSIUM SERPL-SCNC: 4.4 MMOL/L (ref 3.5–5.2)
PROT SERPL-MCNC: 7.1 G/DL (ref 6–8.5)
SODIUM SERPL-SCNC: 144 MMOL/L (ref 134–144)
TSH SERPL DL<=0.005 MIU/L-ACNC: 1.37 UIU/ML (ref 0.45–4.5)

## 2018-07-23 ENCOUNTER — TELEPHONE (OUTPATIENT)
Dept: INTERNAL MEDICINE CLINIC | Age: 68
End: 2018-07-23

## 2018-07-23 DIAGNOSIS — H81.20 VESTIBULAR NEURONITIS, UNSPECIFIED LATERALITY: Primary | ICD-10-CM

## 2018-07-23 RX ORDER — DIAZEPAM 2 MG/1
2 TABLET ORAL 2 TIMES DAILY
Qty: 30 TAB | Refills: 0 | Status: ON HOLD | OUTPATIENT
Start: 2018-07-23 | End: 2020-07-21

## 2018-07-23 NOTE — TELEPHONE ENCOUNTER
Patient is calling, she is requesting a call back, she states that she is under stress (daughter moving, etc.) and would like to know what Dr. Iesha Guadarrama wants her to do with the symptoms (stomach burning, nauseas, no appetite, losing weight) she is having. She does agree with the recommendations given to her when her results were explained, she would like to try the valium medication.      Best call back # for patient: [phone]

## 2018-07-23 NOTE — TELEPHONE ENCOUNTER
Called patient and informed her of her medication being sent in, also advised her after speaking with Dr. Radha Norris if her stomach does not respond well to the Valium she will be referred to a GI specialist.

## 2018-07-23 NOTE — TELEPHONE ENCOUNTER
Would like her to try Valium 2 mg twice a day to see if it helps her symptoms.   Will print prescription

## 2019-02-15 ENCOUNTER — TELEPHONE (OUTPATIENT)
Dept: INTERNAL MEDICINE CLINIC | Age: 69
End: 2019-02-15

## 2019-02-15 NOTE — TELEPHONE ENCOUNTER
Phoned and patient states the she is now urinating blood it was strongly urged to her to go to an urgent care to be treated as we don't have an open appointments left today.

## 2020-07-15 NOTE — PERIOP NOTES
Patient scheduled for manometry on 7/21 being covid tested on 7/17  [facility] in [place] office states no additional test on Sunday July 19th necessary for Bravo clip on 7/23

## 2020-07-17 ENCOUNTER — HOSPITAL ENCOUNTER (OUTPATIENT)
Dept: PREADMISSION TESTING | Age: 70
Discharge: HOME | End: 2020-07-17
Payer: MEDICARE

## 2020-07-17 PROCEDURE — 87635 SARS-COV-2 COVID-19 AMP PRB: CPT

## 2020-07-18 LAB
SARS-COV-2, COV2NT: NOT DETECTED
SOURCE, COVRS: NORMAL
SPECIMEN SOURCE, FCOV2M: NORMAL

## 2020-07-21 ENCOUNTER — HOSPITAL ENCOUNTER (OUTPATIENT)
Age: 70
Setting detail: OUTPATIENT SURGERY
Discharge: HOME | End: 2020-07-21
Attending: SPECIALIST | Admitting: SPECIALIST

## 2020-07-21 VITALS
BODY MASS INDEX: 20.4 KG/M2 | HEART RATE: 68 BPM | RESPIRATION RATE: 16 BRPM | DIASTOLIC BLOOD PRESSURE: 93 MMHG | WEIGHT: 130 LBS | OXYGEN SATURATION: 100 % | SYSTOLIC BLOOD PRESSURE: 187 MMHG | HEIGHT: 67 IN

## 2020-07-21 RX ORDER — LIDOCAINE HYDROCHLORIDE 20 MG/ML
JELLY TOPICAL ONCE
Status: DISCONTINUED | OUTPATIENT
Start: 2020-07-21 | End: 2020-07-21 | Stop reason: HOSPADM

## 2020-07-21 RX ORDER — SUCRALFATE 1 G/1
1 TABLET ORAL 4 TIMES DAILY
COMMUNITY
End: 2020-09-03 | Stop reason: ALTCHOICE

## 2020-07-21 NOTE — PROGRESS NOTES
Esophageal Manometry not attempted. Pt blood pressure initially in 875'B systolic. Explained procedure and allowed for time to relax but pressures only elevated into 625K and 798K systolic. Suggested pt to keep diary of blood pressures for PCP and will talk to Dr. Eveline Ortiz about possible anti-anxiety medication for reschedule.

## 2020-07-23 ENCOUNTER — HOSPITAL ENCOUNTER (OUTPATIENT)
Age: 70
Setting detail: OUTPATIENT SURGERY
Discharge: HOME | End: 2020-07-23
Attending: SPECIALIST | Admitting: SPECIALIST
Payer: MEDICARE

## 2020-07-23 ENCOUNTER — ANESTHESIA (OUTPATIENT)
Dept: ENDOSCOPY | Age: 70
End: 2020-07-23
Payer: MEDICARE

## 2020-07-23 ENCOUNTER — ANESTHESIA EVENT (OUTPATIENT)
Dept: ENDOSCOPY | Age: 70
End: 2020-07-23
Payer: MEDICARE

## 2020-07-23 VITALS
TEMPERATURE: 97.5 F | OXYGEN SATURATION: 100 % | DIASTOLIC BLOOD PRESSURE: 84 MMHG | HEART RATE: 60 BPM | SYSTOLIC BLOOD PRESSURE: 164 MMHG | RESPIRATION RATE: 15 BRPM

## 2020-07-23 PROBLEM — R07.0 THROAT PAIN: Status: ACTIVE | Noted: 2020-07-23

## 2020-07-23 PROCEDURE — 76040000007: Performed by: SPECIALIST

## 2020-07-23 PROCEDURE — 88312 SPECIAL STAINS GROUP 1: CPT

## 2020-07-23 PROCEDURE — 76060000032 HC ANESTHESIA 0.5 TO 1 HR: Performed by: SPECIALIST

## 2020-07-23 PROCEDURE — 74011000250 HC RX REV CODE- 250: Performed by: NURSE ANESTHETIST, CERTIFIED REGISTERED

## 2020-07-23 PROCEDURE — 77030019988 HC FCPS ENDOSC DISP BSC -B: Performed by: SPECIALIST

## 2020-07-23 PROCEDURE — 74011250636 HC RX REV CODE- 250/636: Performed by: SPECIALIST

## 2020-07-23 PROCEDURE — 88112 CYTOPATH CELL ENHANCE TECH: CPT

## 2020-07-23 PROCEDURE — 88305 TISSUE EXAM BY PATHOLOGIST: CPT

## 2020-07-23 PROCEDURE — 74011250636 HC RX REV CODE- 250/636: Performed by: NURSE ANESTHETIST, CERTIFIED REGISTERED

## 2020-07-23 PROCEDURE — 77030034675 HC CAP BRAVO PH W DEL SYS GVIM -C: Performed by: SPECIALIST

## 2020-07-23 RX ORDER — ATROPINE SULFATE 0.1 MG/ML
0.5 INJECTION INTRAVENOUS
Status: DISCONTINUED | OUTPATIENT
Start: 2020-07-23 | End: 2020-07-23 | Stop reason: HOSPADM

## 2020-07-23 RX ORDER — SODIUM CHLORIDE 0.9 % (FLUSH) 0.9 %
5-40 SYRINGE (ML) INJECTION EVERY 8 HOURS
Status: DISCONTINUED | OUTPATIENT
Start: 2020-07-23 | End: 2020-07-23 | Stop reason: HOSPADM

## 2020-07-23 RX ORDER — MIDAZOLAM HYDROCHLORIDE 1 MG/ML
.25-5 INJECTION INTRAMUSCULAR; INTRAVENOUS
Status: DISCONTINUED | OUTPATIENT
Start: 2020-07-23 | End: 2020-07-23 | Stop reason: HOSPADM

## 2020-07-23 RX ORDER — FLUCONAZOLE 100 MG/1
100 TABLET ORAL DAILY
Qty: 15 TAB | Refills: 0 | Status: SHIPPED | OUTPATIENT
Start: 2020-07-23 | End: 2020-08-07

## 2020-07-23 RX ORDER — SODIUM CHLORIDE 9 MG/ML
100 INJECTION, SOLUTION INTRAVENOUS CONTINUOUS
Status: DISCONTINUED | OUTPATIENT
Start: 2020-07-23 | End: 2020-07-23 | Stop reason: HOSPADM

## 2020-07-23 RX ORDER — EPINEPHRINE 0.1 MG/ML
1 INJECTION INTRACARDIAC; INTRAVENOUS
Status: DISCONTINUED | OUTPATIENT
Start: 2020-07-23 | End: 2020-07-23 | Stop reason: HOSPADM

## 2020-07-23 RX ORDER — PROPOFOL 10 MG/ML
INJECTION, EMULSION INTRAVENOUS AS NEEDED
Status: DISCONTINUED | OUTPATIENT
Start: 2020-07-23 | End: 2020-07-23 | Stop reason: HOSPADM

## 2020-07-23 RX ORDER — DEXTROMETHORPHAN/PSEUDOEPHED 2.5-7.5/.8
1.2 DROPS ORAL
Status: DISCONTINUED | OUTPATIENT
Start: 2020-07-23 | End: 2020-07-23 | Stop reason: HOSPADM

## 2020-07-23 RX ORDER — LIDOCAINE HYDROCHLORIDE 20 MG/ML
INJECTION, SOLUTION EPIDURAL; INFILTRATION; INTRACAUDAL; PERINEURAL AS NEEDED
Status: DISCONTINUED | OUTPATIENT
Start: 2020-07-23 | End: 2020-07-23 | Stop reason: HOSPADM

## 2020-07-23 RX ORDER — NALOXONE HYDROCHLORIDE 0.4 MG/ML
0.4 INJECTION, SOLUTION INTRAMUSCULAR; INTRAVENOUS; SUBCUTANEOUS
Status: DISCONTINUED | OUTPATIENT
Start: 2020-07-23 | End: 2020-07-23 | Stop reason: HOSPADM

## 2020-07-23 RX ORDER — MIDAZOLAM HYDROCHLORIDE 1 MG/ML
.25-5 INJECTION, SOLUTION INTRAMUSCULAR; INTRAVENOUS
Status: DISCONTINUED | OUTPATIENT
Start: 2020-07-23 | End: 2020-07-23

## 2020-07-23 RX ORDER — FLUMAZENIL 0.1 MG/ML
0.2 INJECTION INTRAVENOUS
Status: DISCONTINUED | OUTPATIENT
Start: 2020-07-23 | End: 2020-07-23 | Stop reason: HOSPADM

## 2020-07-23 RX ORDER — SODIUM CHLORIDE 0.9 % (FLUSH) 0.9 %
5-40 SYRINGE (ML) INJECTION AS NEEDED
Status: DISCONTINUED | OUTPATIENT
Start: 2020-07-23 | End: 2020-07-23 | Stop reason: HOSPADM

## 2020-07-23 RX ADMIN — SODIUM CHLORIDE: 900 INJECTION, SOLUTION INTRAVENOUS at 11:37

## 2020-07-23 RX ADMIN — PROPOFOL 100 MG: 10 INJECTION, EMULSION INTRAVENOUS at 11:54

## 2020-07-23 RX ADMIN — LIDOCAINE HYDROCHLORIDE 20 MG: 20 INJECTION, SOLUTION EPIDURAL; INFILTRATION; INTRACAUDAL; PERINEURAL at 11:46

## 2020-07-23 RX ADMIN — PROPOFOL 130 MG: 10 INJECTION, EMULSION INTRAVENOUS at 11:59

## 2020-07-23 NOTE — ANESTHESIA POSTPROCEDURE EVALUATION
Procedure(s):  ESOPHAGOGASTRODUODENOSCOPY (EGD)  BRAVO CLIP PLACEMENT  ESOPHAGOGASTRODUODENAL (EGD) BIOPSY  ENDOSCOPIC BRUSHING. total IV anesthesia    Anesthesia Post Evaluation        Patient location during evaluation: PACU  Note status: Adequate. Level of consciousness: responsive to verbal stimuli and sleepy but conscious  Pain management: satisfactory to patient  Airway patency: patent  Anesthetic complications: no  Cardiovascular status: acceptable  Respiratory status: acceptable  Hydration status: acceptable  Comments: +Post-Anesthesia Evaluation and Assessment    Patient: Shanna Weston MRN: [id number]  SSN: [id number]   YOB: 1950  Age: 81 y.o. Sex: female      Cardiovascular Function/Vital Signs    /84   Pulse 60   Temp 36.4 °C (97.5 °F)   Resp 15   SpO2 100%     Patient is status post Procedure(s):  ESOPHAGOGASTRODUODENOSCOPY (EGD)  BRAVO CLIP PLACEMENT  ESOPHAGOGASTRODUODENAL (EGD) BIOPSY  ENDOSCOPIC BRUSHING. Nausea/Vomiting: Controlled. Postoperative hydration reviewed and adequate. Pain:  Pain Scale 1: Numeric (0 - 10) (07/23/20 1236)  Pain Intensity 1: 0 (07/23/20 1236)   Managed. Neurological Status: At baseline. Mental Status and Level of Consciousness: Arousable. Pulmonary Status:   O2 Device: Room air (07/23/20 1236)   Adequate oxygenation and airway patent. Complications related to anesthesia: None    Post-anesthesia assessment completed. No concerns.     Signed By: Miki Flores MD    7/23/2020  Post anesthesia nausea and vomiting:  controlled      INITIAL Post-op Vital signs:   Vitals Value Taken Time   /84 7/23/2020 12:36 PM   Temp 36.4 °C (97.5 °F) 7/23/2020 12:29 PM   Pulse 60 7/23/2020 12:36 PM   Resp 15 7/23/2020 12:36 PM   SpO2 100 % 7/23/2020 12:36 PM

## 2020-07-23 NOTE — ANESTHESIA PREPROCEDURE EVALUATION
Relevant Problems   No relevant active problems       Anesthetic History     PONV          Review of Systems / Medical History  Patient summary reviewed, nursing notes reviewed and pertinent labs reviewed    Pulmonary  Within defined limits                 Neuro/Psych         Psychiatric history     Cardiovascular              CAD    Exercise tolerance: >4 METS  Comments: No know CAD, pt had a high Ca score on calcium scan. No CP,SOB, but her son  young of a heart attack?    GI/Hepatic/Renal     GERD           Endo/Other  Within defined limits           Other Findings   Comments: Factor V mutation-no clinical issues         Physical Exam    Airway  Mallampati: II  TM Distance: 4 - 6 cm  Neck ROM: normal range of motion   Mouth opening: Normal     Cardiovascular  Regular rate and rhythm,  S1 and S2 normal,  no murmur, click, rub, or gallop             Dental  No notable dental hx       Pulmonary  Breath sounds clear to auscultation               Abdominal  GI exam deferred       Other Findings            Anesthetic Plan    ASA: 2  Anesthesia type: MAC            Anesthetic plan and risks discussed with: Patient

## 2020-07-23 NOTE — PROCEDURES
Esophagogastroduodenoscopy    Indications:  Throat discomfort    Medications:  See anesthesia form    Assistants:  Andreas James RN      Post procedure diagnosis: Thrush, Likely yeast of esophagus, Gastric polyps  Nodule stomach    Description of Procedure:    Prior to the procedure its objectives, risks, consequences and alternatives were discussed with the patient who then elected to proceed. The Olympus video endoscope was inserted under direct vision into the mouth and then into the esophagus. There was a small amount of thrush noted at the base of the tongue. In the proximal/ mid esophagus there was yellow white exudate consistent with yeast.  I took brushings. The underlying esophagus looked normal.    The z-line was located at 44 cm. There were no diagnostic abnormalities of the body, fundus, antrum, cardia and incisura of the mucosa stomach. This included direct and retroflexion examination. There were multiple gastric polyps (small, likely fundic). At the pre-pyloric antrum there was a 14 mm nodule covered by erytheamtous mucosa. I suspect this is scar tissue. The first and second portion of the duodenum appeared normal.  I took duodenal biopsies, stomach biopsies (including the nodule and one of the polyps), and mid esophageal biopsies. I then placed a [employer] Bravo clip in a standard fashion at 38 cm. A brief repeat endoscopy with photograph confirmed proper Bravo placement. Complications: There were no apparent complications and the patient tolerated the procedure well. Implants:  none    Estimated Blood Loss:  none  Specimens Removed:  Esophageal brush cytology  Duodenum  Stomach (including nodule and polyp)  Mid esophagus  Impressions: Thrush  Likely candida esophagitis  Gastric polyps  Nodule at pylorus with erythema.    Successful Bravo Clip      Signed By: Serena Mark MD                        July 23, 2020     12:03 PM

## 2020-07-23 NOTE — ROUTINE PROCESS
Both written and verbal discharge instructions given to patient and . Opportunity for questions provided. Patient vrbalized understanding.

## 2020-07-23 NOTE — ROUTINE PROCESS
Lazaro Colon  1950  [id number]    Situation:  Verbal report received from: Shayne Gonzalez  Procedure: Procedure(s):  ESOPHAGOGASTRODUODENOSCOPY (EGD)  BRAVO CLIP PLACEMENT  ESOPHAGOGASTRODUODENAL (EGD) BIOPSY  ENDOSCOPIC BRUSHING    Background:    Preoperative diagnosis: ACID REFLUX, EPIGASTRIC PAIN, PAIN IN THROAT  Postoperative diagnosis: Thrush, Likely yeast of esophagus, Gastric polyps, Erythema of gastric nodule    :  Dr. Jose Romero  Assistant(s): Endoscopy RN-1: Stef Vogt RN; Robert William    Specimens:   ID Type Source Tests Collected by Time Destination   1 : Duodenum Biopsy Preservative Duodenum  Mayra Sellers MD 7/23/2020 1155 Pathology   2 : Stomach and Stomach Nodule Biopsy Preservative Stomach  Mayra Sellers MD 7/23/2020 1156 Pathology   3 : Mid Espohagus Biopsy Preservative Esophagus, Mid  Mayra Sellers MD 7/23/2020 1158 Pathology   1 : Esophageal Brushing Fresh Esophageal Brushing  Mayra Sellers MD 7/23/2020 1202 Cytology     H. Pylori  no    Assessment:  Intra-procedure medications     Anesthesia gave intra-procedure sedation and medications, see anesthesia flow sheet yes    Intravenous fluids: NS@ KVO     Vital signs stable     Abdominal assessment: round and soft     Recommendation:  Discharge patient per MD order.   Return to floor  Family or Friend   Permission to share finding with family or friend yes

## 2020-07-23 NOTE — DISCHARGE INSTRUCTIONS
Albertoard Needle  [id number]  1950              Procedure  Discharge Instructions:      Discomfort:  Redness at IV site- apply warm compress to area; if redness or soreness persist- contact your physician  There may be a slight amount of blood passed from the rectum  Gaseous discomfort- walking, belching will help relieve any discomfort  You may not operate a vehicle for 12 hours  You may not engage in an occupation involving machinery or appliances for rest of today  You may not drink alcoholic beverages for at least 12 hours  Avoid making any critical decisions for at least 24 hour  DIET:   You may resume your normal diet today. You should not overeat or \"feast\" today as your abdomen may become distended or uncomfortable. MEDICATIONS:   I reconciled this list from the list you gave us when you came today for the procedure. Please clarify with me, your primary care physician and the nurse who is discharging you if we have any discrepancies. Aspirin and or non-steroidal medication (Ibuprofen, Motrin, naproxen, etc.) is ok in limited quantities. ACTIVITY:  You may resume your normal daily activities it is recommended that you spend the remainder of the day resting -  avoid any strenuous activity. CALL M.D. ANY SIGN OF:  Increasing pain, nausea, vomiting  Abdominal distension (swelling)  New increased bleeding (oral or rectal)  Fever (chills)  Pain in chest area  Bloody discharge from nose or mouth  Shortness of breath          Follow-up Instructions:   Call Dr. John Paul Carey for the results of  biopsy in approximately one week  Telephone #  [phone]  Follow up visit as previously scheduled.     Diflucan is for yeast of the esophagus (may help your symptoms)  I spoke to Jin Castaneda in person  Chinyere Dickinson to restart Famotidine at noon on Saturday 25 July, 2020  Re Bose MD  12:02 PM  7/23/2020

## 2020-07-23 NOTE — H&P
Pre-endoscopy H and P    The patient was seen and examined in the endoscopy suite. The airway was assessed and docuemented. The problem list, past medical history, and medications were reviewed. The history is:  A long time ago she was seen by ent for a lump in the throat and given ranitidine for reflux. It happended again and she was referred here. She was seen by Dr Angelica Nye in 2017 given PPI. ONce her symptoms went away she stopped taking it.     egd: Findings:   Esophagus Lumen A reducible sliding medium size hiatal hernia was seen, the Z line was noted at 39 cm, with hiatal narrowing at 41 cm from the incisors. Possible 1 cm tongue of coral colored mucosa at [facility]. Cold forceps biopsies were performed for histology. Mucosa Normal mucosa was noted in the middle third of the esophagus and upper third of the esophagus. Cold forceps biopsies were performed for histology. Stomach Contents Bilious fluid was found in the stomach body. Mucosa Localized erythema of the mucosa with no bleeding was noted in the antrum and stomach body. These findings are compatible with gastritis. Cold forceps biopsies were performed for histology. Protruding lesions Multiple polyps of benign appearance were found in the stomach body. Cold forceps biopsies were performed for histology. Duodenum Mucosa Normal mucosa was noted in the first part of the duodenum and second part of the duodenum. 2017 bx reflux like changes and fundic polyp     she has had recurrent nausea, epigastric pain and burning headache and a lump in the thtorat. She and I did a telemed with me. Given sucralfate and famotidine. the burning is better. The throat pain is not. Throat discomfort base of neck. feels that her collar is too tight. full sensation and often feels that she has to burp and cannot release it.        Patient Active Problem List   Diagnosis Code    Dizziness R42    Nausea & vomiting R11.2    ASCVD (arteriosclerotic cardiovascular disease) I25.10    Vestibular neuronitis of left ear H81.22    Depression F32.9    Diverticulosis of intestine without bleeding K57.90    Heterozygous factor V Leiden mutation ([facility]) D68.51    IBS (irritable bowel syndrome) K58.9    Insomnia G47.00    Palpitations R00.2    Throat pain R07.0     Social History     Socioeconomic History    Marital status:      Spouse name: Not on file    Number of children: Not on file    Years of education: Not on file    Highest education level: Not on file   Occupational History    Not on file   Social Needs    Financial resource strain: Not on file    Food insecurity     Worry: Not on file     Inability: Not on file    Transportation needs     Medical: Not on file     Non-medical: Not on file   Tobacco Use    Smoking status: Never Smoker    Smokeless tobacco: Never Used   Substance and Sexual Activity    Alcohol use: No    Drug use: No    Sexual activity: Never   Lifestyle    Physical activity     Days per week: Not on file     Minutes per session: Not on file    Stress: Not on file   Relationships    Social connections     Talks on phone: Not on file     Gets together: Not on file     Attends Jain service: Not on file     Active member of club or organization: Not on file     Attends meetings of clubs or organizations: Not on file     Relationship status: Not on file    Intimate partner violence     Fear of current or ex partner: Not on file     Emotionally abused: Not on file     Physically abused: Not on file     Forced sexual activity: Not on file   Other Topics Concern    Not on file   Social History Narrative    Not on file     Past Medical History:   Diagnosis Date    ASCVD (arteriosclerotic cardiovascular disease) 10/1/2017    CAD (coronary artery disease)     Depression 10/6/2017    Diverticulosis of intestine without bleeding 10/6/2017    With diverticulitis    Heterozygous factor V Leiden mutation ([facility] [address] 10/6/2017    Hyperlipidemia 10/1/2017    IBS (irritable bowel syndrome) 10/6/2017    Insomnia 1/16/2018    Throat pain 7/23/2020    Vestibular neuronitis of both ears 10/3/2017     The patient has a family history of na    Prior to Admission Medications   Prescriptions Last Dose Informant Patient Reported? Taking? FAMOTIDINE PO 7/16/2020  Yes No   Sig: Take 40 mg by mouth two (2) times a day. Indications: GERD   sucralfate (CARAFATE) 1 gram tablet 7/16/2020  Yes No   Sig: Take 1 g by mouth four (4) times daily. Indications: gastroesophageal reflux disease, as needed      Facility-Administered Medications: None           The review of systems is:  Negative  for shortness of breath or chest pain      The heart, lungs, and mental status were satisfactory for the administration of anesthesia sedation and for the procedure. I discussed with the patient the objectives, risks, consequences and alternatives to the procedure. The patient was counseled at length about the risks of christi Covid-19 during their perioperative period and any recovery window from their procedure. The patient was made aware that christi Covid-19  may worsen their prognosis for recovering from their procedure and lend to a higher morbidity and/or mortality risk. All material risks, benefits, and reasonable alternatives including postponing the procedure were discussed. The patient does  wish to proceed with the procedure at this time.         Samantha Biggs MD  7/23/2020  9:59 AM

## 2020-07-31 ENCOUNTER — HOSPITAL ENCOUNTER (OUTPATIENT)
Dept: PREADMISSION TESTING | Age: 70
Discharge: HOME | End: 2020-07-31
Payer: MEDICARE

## 2020-07-31 PROCEDURE — 87635 SARS-COV-2 COVID-19 AMP PRB: CPT

## 2020-08-01 LAB
SARS-COV-2, COV2NT: NOT DETECTED
SOURCE, COVRS: NORMAL
SPECIMEN SOURCE, FCOV2M: NORMAL

## 2020-08-03 NOTE — OP NOTES
[address]  OPERATIVE REPORT    Name:  Lizzy Wyatt  MR#:  [id number]  :  1950  ACCOUNT #:  [de-identified]  DATE OF SERVICE:  2020    PREPROCEDURE DIAGNOSES:  1. Throat pain. 2.  other esophagitis. POSTPROCEDURE DIAGNOSES:  1. The study was performed off of all medication. 2.  No excess gastroesophageal reflux. 3.  Blue-DeMeester score of 2.1, normal less than 14.72.  4.  Symptom index and symptom associated probability are 0 for heartburn, chest pain and regurgitation. PROCEDURE PLANNED:  48-hour pH Bravo clip. PROCEDURE PERFORMED:  48-hour pH Bravo clip. SURGEON:  Silvia Michaels MD    ASSISTANT:  Farhana Thorpe RN    ANESTHESIA:  See separate endoscopy report. COMPLICATIONS:  None. SPECIMENS REMOVED:  None. IMPLANTS:  Bravo clip implant, it will fall off. ESTIMATED BLOOD LOSS:  none. DESCRIPTION OF PROCEDURE:  See separate endoscopy report for the placement of the Bravo clip on 2020. The patient is monitored for 48 hours with the conclusion of the procedure on 2020. During the total study, there is 0.3% reflux, of this there are 0.4% upright and 0% supine. Blue-DeMeester score is normal.    There are 5 episodes of heartburn, two of chest pain and three of regurgitation. Symptom index and symptom associated probably are 0. There is mild significant difference between day 1 and day 2 analysis and therefore these are not scored separately.         Jenifer Galvan MD      [id number]  D:  2020 8:25  T:  2020 13:14  JOB #:  [id number]  CC:  Frutoso Falter, MD Erick Moritz, MD

## 2020-08-04 ENCOUNTER — HOSPITAL ENCOUNTER (OUTPATIENT)
Age: 70
Setting detail: OUTPATIENT SURGERY
Discharge: HOME | End: 2020-08-04
Attending: SPECIALIST | Admitting: SPECIALIST
Payer: MEDICARE

## 2020-08-04 VITALS
OXYGEN SATURATION: 100 % | HEART RATE: 65 BPM | RESPIRATION RATE: 20 BRPM | SYSTOLIC BLOOD PRESSURE: 147 MMHG | DIASTOLIC BLOOD PRESSURE: 86 MMHG

## 2020-08-04 PROCEDURE — 76040000007: Performed by: SPECIALIST

## 2020-08-04 PROCEDURE — 74011000250 HC RX REV CODE- 250: Performed by: SPECIALIST

## 2020-08-04 RX ORDER — LIDOCAINE HYDROCHLORIDE 20 MG/ML
JELLY TOPICAL ONCE
Status: COMPLETED | OUTPATIENT
Start: 2020-08-04 | End: 2020-08-04

## 2020-08-04 RX ADMIN — LIDOCAINE HYDROCHLORIDE 5 ML: 20 JELLY TOPICAL at 10:10

## 2020-08-04 NOTE — DISCHARGE INSTRUCTIONS
Lazaro Colon  [id number]  1950      MANOMETRY DISCHARGE INSTRUCTION    You may resume your regular diet as tolerated. You may resume your normal daily activities. If you develop a sore throat- throat lozenges or warm salt water gargles will help. Call your Physician if you have any complications or questions. [employer] Activation    Thank you for requesting access to [employer]. Please follow the instructions below to securely access and download your online medical record. [employer] allows you to send messages to your doctor, view your test results, renew your prescriptions, schedule appointments, and more. How Do I Sign Up? 1. In your internet browser, go to [email or web address]  2. Click on the First Time User? Click Here link in the Sign In box. You will be redirect to the New Member Sign Up page. 3. Enter your [employer] Access Code exactly as it appears below. You will not need to use this code after youve completed the sign-up process. If you do not sign up before the expiration date, you must request a new code. [employer] Access Code: Activation code not generated  Current [employer] Status: Active (This is the date your [employer] access code will )    4. Enter the last four digits of your Social Security Number (xxxx) and Date of Birth (mm/dd/yyyy) as indicated and click Submit. You will be taken to the next sign-up page. 5. Create a [employer] ID. This will be your [employer] login ID and cannot be changed, so think of one that is secure and easy to remember. 6. Create a [employer] password. You can change your password at any time. 7. Enter your Password Reset Question and Answer. This can be used at a later time if you forget your password. 8. Enter your e-mail address. You will receive e-mail notification when new information is available in [address] 9. Click Sign Up. You can now view and download portions of your medical record.   10. Click the Download Summary menu link to download a portable copy of your medical information. Additional Information    If you have questions, please visit the Frequently Asked Questions section of the [employer] website at [email or web address] Remember, [employer] is NOT to be used for urgent needs. For medical emergencies, dial 911.

## 2020-08-04 NOTE — PROGRESS NOTES
5cc viscous lidocaine inhaled into bilateral nare per MD orders. Probe inserted into  bilateral nare without difficulty. Pt tolerated procedure well.

## 2020-08-19 NOTE — OP NOTES
[address]  OPERATIVE REPORT    Name:  Shanna Mai  MR#:  [id number]  :  1950  ACCOUNT #:  [de-identified]  DATE OF SERVICE:  2020      PREPROCEDURE DIAGNOSES:  Throat pain, epigastric pain. POSTPROCEDURE DIAGNOSES:  1. Esophagogastric junction outflow obstruction. 2.  Impedance manometry shows that 20% of impedance boluses failed to clear. 3.  There is peristalsis in the esophageal body. PROCEDURE PLANNED:  1. Esophageal manometry. 2.  Impedance esophageal manometry. SURGEON:  David Orr MD    ASSISTANT:  Renetta Stevens RN      ANESTHESIA:  topical.    COMPLICATIONS:  none. SPECIMENS REMOVED:  none. IMPLANTS:  none. ESTIMATED BLOOD LOSS:  none. PREPROCEDURE DIAGNOSIS    DESCRIPTION OF PROCEDURE:  High-resolution esophageal manometry was performed by the nursing staff with subsequent interpretation by Dr. Denisa Zuiñga. The lower esophageal sphincter is at 45.9 cm. The length of the lower esophageal sphincter is approximately 4 cm. Lower esophageal sphincter pressures are normal at rest.  Respiratory minimum 16.1, respiratory mean 31.5. Residual pressure after swallowing is slightly elevated at 17.5 (less than 15). Wet swallows are administered. Eight are peristaltic, two are simultaneous by standard criteria. The mean amplitude in the distal esophagus is normal at 104.5 mmHg. The wave duration is normal at 4.2 seconds. There are 62% double peaked waves (less than 15% is normal). There are no triple peaked waves. The velocity is slow at 2.2 cm/sec (226.3)    High-resolution scoring is as follows:  DCI normal 4538.9, contractile front velocity normal 2.9, intrabolus pressure normal at 7.6, intrabolus pressure average max body of the esophagus elevated 22.2 (less than 17). [place] scoring is as follows:  Distal latency 7.4%, failed 30%, panesophageal pressurization 20%, premature 0%, rapid 0%, large break 0%, small break 0.     Impedance manometry is performed with a flavored electrolyte solution. Two of 10 impedance boluses failed to clear completely. Esophagogastric junction outflow obstruction is likely present. This can be seen. Although the measurement is only slightly elevated, there are secondary changes in the esophageal body that suggest this is a real phenomenon.         Julia Bobo MD      [id number]  D:  08/19/2020 7:12  T:  08/19/2020 9:02  JOB #:  [id number]  CC:  MD Sabina Jeong MD

## 2020-08-25 ENCOUNTER — TELEPHONE (OUTPATIENT)
Dept: INTERNAL MEDICINE CLINIC | Age: 70
End: 2020-08-25

## 2020-08-25 NOTE — TELEPHONE ENCOUNTER
Patient states her gastro doctor prescribed Dilitaezim. The side effect is it lowers your blood pressure. Should she take this? I made an appt for her for 9/3/20 for a complete physical because she wants to have several labs done and she hasn't been seen in a while.      [phone]

## 2020-08-25 NOTE — TELEPHONE ENCOUNTER
Yes it can lower your blood pressure. She will need to watch for dizziness while on this medication.

## 2020-08-31 ENCOUNTER — HOSPITAL ENCOUNTER (OUTPATIENT)
Dept: ULTRASOUND IMAGING | Age: 70
Discharge: HOME | End: 2020-08-31
Attending: SPECIALIST
Payer: MEDICARE

## 2020-08-31 ENCOUNTER — HOSPITAL ENCOUNTER (OUTPATIENT)
Dept: NUCLEAR MEDICINE | Age: 70
Discharge: HOME | End: 2020-08-31
Attending: SPECIALIST
Payer: MEDICARE

## 2020-08-31 VITALS — BODY MASS INDEX: 20.05 KG/M2 | WEIGHT: 128 LBS

## 2020-08-31 DIAGNOSIS — B37.81 CANDIDIASIS OF ESOPHAGUS (HCC): ICD-10-CM

## 2020-08-31 DIAGNOSIS — R07.0 PAIN IN THROAT: ICD-10-CM

## 2020-08-31 DIAGNOSIS — R10.13 EPIGASTRIC PAIN: ICD-10-CM

## 2020-08-31 DIAGNOSIS — R14.3 FLATULENCE, ERUCTATION, AND GAS PAIN: ICD-10-CM

## 2020-08-31 DIAGNOSIS — K31.7 POLYP OF STOMACH AND DUODENUM: ICD-10-CM

## 2020-08-31 DIAGNOSIS — R14.2 FLATULENCE, ERUCTATION, AND GAS PAIN: ICD-10-CM

## 2020-08-31 DIAGNOSIS — R14.1 FLATULENCE, ERUCTATION, AND GAS PAIN: ICD-10-CM

## 2020-08-31 PROCEDURE — 78227 HEPATOBIL SYST IMAGE W/DRUG: CPT

## 2020-08-31 PROCEDURE — 76700 US EXAM ABDOM COMPLETE: CPT

## 2020-08-31 PROCEDURE — 74011250636 HC RX REV CODE- 250/636: Performed by: SPECIALIST

## 2020-08-31 RX ADMIN — SINCALIDE 1.16 MCG: 5 INJECTION, POWDER, LYOPHILIZED, FOR SOLUTION INTRAVENOUS at 14:30

## 2020-09-03 ENCOUNTER — OFFICE VISIT (OUTPATIENT)
Dept: INTERNAL MEDICINE CLINIC | Age: 70
End: 2020-09-03
Payer: MEDICARE

## 2020-09-03 VITALS
RESPIRATION RATE: 16 BRPM | SYSTOLIC BLOOD PRESSURE: 116 MMHG | WEIGHT: 125.6 LBS | TEMPERATURE: 97.7 F | OXYGEN SATURATION: 95 % | HEART RATE: 65 BPM | BODY MASS INDEX: 19.71 KG/M2 | DIASTOLIC BLOOD PRESSURE: 78 MMHG | HEIGHT: 67 IN

## 2020-09-03 DIAGNOSIS — K58.9 IRRITABLE BOWEL SYNDROME, UNSPECIFIED TYPE: ICD-10-CM

## 2020-09-03 DIAGNOSIS — K57.30 DIVERTICULOSIS OF LARGE INTESTINE WITHOUT HEMORRHAGE: ICD-10-CM

## 2020-09-03 DIAGNOSIS — Z11.59 NEED FOR HEPATITIS C SCREENING TEST: ICD-10-CM

## 2020-09-03 DIAGNOSIS — H81.22 VESTIBULAR NEURONITIS OF LEFT EAR: ICD-10-CM

## 2020-09-03 DIAGNOSIS — Z00.00 INITIAL MEDICARE ANNUAL WELLNESS VISIT: Primary | ICD-10-CM

## 2020-09-03 DIAGNOSIS — I25.10 ASCVD (ARTERIOSCLEROTIC CARDIOVASCULAR DISEASE): ICD-10-CM

## 2020-09-03 DIAGNOSIS — D68.51 HETEROZYGOUS FACTOR V LEIDEN MUTATION (HCC): ICD-10-CM

## 2020-09-03 LAB
A-G RATIO,AGRAT: 1.9 RATIO
ALBUMIN SERPL-MCNC: 5.1 G/DL (ref 3.9–5.4)
ALP SERPL-CCNC: 64 U/L (ref 38–126)
ALT SERPL-CCNC: 20 U/L (ref 0–35)
ANION GAP SERPL CALC-SCNC: 10 MMOL/L
AST SERPL W P-5'-P-CCNC: 28 U/L (ref 14–36)
BILIRUB SERPL-MCNC: 0.7 MG/DL (ref 0.2–1.3)
BUN SERPL-MCNC: 16 MG/DL (ref 7–17)
BUN/CREATININE RATIO,BUCR: 18 RATIO
CALCIUM SERPL-MCNC: 9.8 MG/DL (ref 8.4–10.2)
CHLORIDE SERPL-SCNC: 104 MMOL/L (ref 98–107)
CHOL/HDL RATIO,CHHD: 3 RATIO (ref 0–4)
CHOLEST SERPL-MCNC: 232 MG/DL (ref 0–200)
CO2 SERPL-SCNC: 30 MMOL/L (ref 22–32)
CREAT SERPL-MCNC: 0.9 MG/DL (ref 0.7–1.2)
GLOBULIN,GLOB: 2.7
GLUCOSE SERPL-MCNC: 97 MG/DL (ref 65–105)
HDLC SERPL-MCNC: 73 MG/DL (ref 35–130)
LDL/HDL RATIO,LDHD: 2 RATIO
LDLC SERPL CALC-MCNC: 144 MG/DL (ref 0–130)
POTASSIUM SERPL-SCNC: 4.5 MMOL/L (ref 3.6–5)
PROT SERPL-MCNC: 7.8 G/DL (ref 6.3–8.2)
SODIUM SERPL-SCNC: 144 MMOL/L (ref 137–145)
TRIGL SERPL-MCNC: 74 MG/DL (ref 0–200)
TSH SERPL DL<=0.05 MIU/L-ACNC: 1.29 UIU/ML (ref 0.34–5.6)
VLDLC SERPL CALC-MCNC: 15 MG/DL

## 2020-09-03 PROCEDURE — 80061 LIPID PANEL: CPT | Performed by: INTERNAL MEDICINE

## 2020-09-03 PROCEDURE — 1101F PT FALLS ASSESS-DOCD LE1/YR: CPT | Performed by: INTERNAL MEDICINE

## 2020-09-03 PROCEDURE — G8427 DOCREV CUR MEDS BY ELIG CLIN: HCPCS | Performed by: INTERNAL MEDICINE

## 2020-09-03 PROCEDURE — 1090F PRES/ABSN URINE INCON ASSESS: CPT | Performed by: INTERNAL MEDICINE

## 2020-09-03 PROCEDURE — G9899 SCRN MAM PERF RSLTS DOC: HCPCS | Performed by: INTERNAL MEDICINE

## 2020-09-03 PROCEDURE — 84443 ASSAY THYROID STIM HORMONE: CPT | Performed by: INTERNAL MEDICINE

## 2020-09-03 PROCEDURE — 80053 COMPREHEN METABOLIC PANEL: CPT | Performed by: INTERNAL MEDICINE

## 2020-09-03 PROCEDURE — 99214 OFFICE O/P EST MOD 30 MIN: CPT | Performed by: INTERNAL MEDICINE

## 2020-09-03 PROCEDURE — G9717 DOC PT DX DEP/BP F/U NT REQ: HCPCS | Performed by: INTERNAL MEDICINE

## 2020-09-03 PROCEDURE — G0438 PPPS, INITIAL VISIT: HCPCS | Performed by: INTERNAL MEDICINE

## 2020-09-03 PROCEDURE — G8420 CALC BMI NORM PARAMETERS: HCPCS | Performed by: INTERNAL MEDICINE

## 2020-09-03 PROCEDURE — G8400 PT W/DXA NO RESULTS DOC: HCPCS | Performed by: INTERNAL MEDICINE

## 2020-09-03 PROCEDURE — 3017F COLORECTAL CA SCREEN DOC REV: CPT | Performed by: INTERNAL MEDICINE

## 2020-09-03 PROCEDURE — G8536 NO DOC ELDER MAL SCRN: HCPCS | Performed by: INTERNAL MEDICINE

## 2020-09-03 PROCEDURE — 36415 COLL VENOUS BLD VENIPUNCTURE: CPT | Performed by: INTERNAL MEDICINE

## 2020-09-03 NOTE — PROGRESS NOTES
This note will not be viewable in [address] is a 81 y.o. female who presents for an Initial Medicare Annual Wellness Exam (AWV) and follow up of chronic medical conditions. I have reviewed the patient's medical history in detail and updated the computerized patient record. History     Subjective:  Mrs. Dina Florence is a 80-year-old female who presents for the office today for Medicare wellness check and follow-up of multiple medical problems. More recently the patient has been having problems with throat discomfort. At times she feels as though there is something stuck in her throat. The patient has been seen by ENT who did not find anything remarkable and referred her to Dr. Esther Gay. The patient underwent an extensive work-up. She did not respond to acid reducers and Carafate. 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 The patient was prescribed Cardizem for this but did not take it because she had a history of vestibular neuronitis with severe dizziness and was afraid that the medication would make her dizzy as well. The patient did have a nuclear medicine gallbladder scan and had a low EF but denies any abdominal pain. The patient does periodically have dizziness from her vestibular neuronitis which periodically flares. She was hospitalized for period of time when she initially presented with this with negative work-up. Patient also has ASCVD for which she has had an elevated coronary calcium score of greater than 500 and has had negative stress test in the past.  She did have a son who  of early coronary disease at the age of 32. The patient notes no exertional symptoms related to her esophageal discomfort but has not been seen by a cardiologist for stress testing in a number of years.   Patient is also been found to have heterozygous factor V Leiden mutation on previous lab work with but is never had any blood clots or bleeding problems. She does have a history of irritable bowel syndrome but has had no problems with constipation or diarrhea. Colonoscopy was done greater than 5 years ago with findings of diverticulosis but she has never had acute diverticulitis.     Patient Active Problem List   Diagnosis Code    Dizziness R42    Nausea & vomiting R11.2    ASCVD (arteriosclerotic cardiovascular disease) I25.10    Vestibular neuronitis of left ear H81.22    Depression F32.9    Diverticulosis of intestine without bleeding K57.90    Heterozygous factor V Leiden mutation ([facility] [address] D68.51    IBS (irritable bowel syndrome) K58.9    Insomnia G47.00    Palpitations R00.2    Throat pain R07.0       Patient Care Team:  Parviz Lewis MD as PCP - General (Internal Medicine)  Parviz Leiws MD as PCP - [facility]    Past Medical History:   Diagnosis Date    ASCVD (arteriosclerotic cardiovascular disease) 10/1/2017    CAD (coronary artery disease)     Depression 10/6/2017    Diverticulosis of intestine without bleeding 10/6/2017    With diverticulitis    Heterozygous factor V Leiden mutation ([facility] [address] 10/6/2017    Hyperlipidemia 10/1/2017    IBS (irritable bowel syndrome) 10/6/2017    Insomnia 1/16/2018    Throat pain 7/23/2020    Vestibular neuronitis of both ears 10/3/2017     Past Surgical History:   Procedure Laterality Date    HX GI      hemmorhoidectomy    VT EGD DELIVER THERMAL ENERGY SPHNCTR/CARDIA GERD  2017    UPPER GI ENDOSCOPY,BIOPSY  7/23/2020          No Known Allergies    Social History     Socioeconomic History    Marital status:      Spouse name: Not on file    Number of children: Not on file    Years of education: Not on file    Highest education level: Not on file   Tobacco Use    Smoking status: Never Smoker    Smokeless tobacco: Never Used   Substance and Sexual Activity    Alcohol use: No    Drug use: No    Sexual activity: Never Family History   Problem Relation Age of Onset    No Known Problems Mother     No Known Problems Father     Sudden Death Other      Health Maintenance   Topic Date Due    Hepatitis C Screening  1950    Colonoscopy  09/26/1970    Bone Densitometry (Dexa) Screening  09/26/2017    Medicare Yearly Exam  03/14/2018    Shingrix Vaccine Age 50> (1 of 2) 12/09/2020 (Originally 9/26/2002)    Flu Vaccine (1) 06/30/2021 (Originally 9/1/2020)    DTaP/Tdap/Td series (1 - Tdap) 09/03/2021 (Originally 9/26/1973)    Pneumococcal 65+ years (1 of 1 - PPSV23) 09/03/2021 (Originally 9/26/2017)    Breast Cancer Screen Mammogram  01/02/2021    GLAUCOMA SCREENING Q2Y  03/04/2022    Lipid Screen  10/01/2022          Review of Systems  Constitutional: negative for fevers, chills, anorexia and weight loss  Eyes:   negative for visual disturbance and irritation  ENT:   Positive for chronic throat discomfort  Respiratory:  negative for cough, hemoptysis, dyspnea,wheezing  CV:   negative for chest pain, palpitations, lower extremity edema  GI:   negative for nausea, vomiting, diarrhea, abdominal pain,melena  Endo:               negative for polyuria,polydipsia,polyphagia,heat intolerance  Genitourinary: negative for frequency, dysuria and hematuria  Integumentary: negative for rash and pruritus  Hematologic:  negative for easy bruising and gum/nose bleeding  Musculoskel: negative for myalgias, arthralgias, back pain, muscle weakness, joint pain  Neurological:  negative for headaches, dizziness, vertigo, memory problems and gait   Behavl/Psych: negative for feelings of anxiety, depression, mood changes  ROS otherwise negative      Depression Risk Factor Screening:     3 most recent PHQ Screens 9/3/2020   Little interest or pleasure in doing things Not at all   Feeling down, depressed, irritable, or hopeless Not at all   Total Score PHQ 2 0   Trouble falling or staying asleep, or sleeping too much Not at all   Feeling tired or having little energy Not at all   Poor appetite, weight loss, or overeating Not at all   Feeling bad about yourself - or that you are a failure or have let yourself or your family down Not at all   Trouble concentrating on things such as school, work, reading, or watching TV Not at all   Moving or speaking so slowly that other people could have noticed; or the opposite being so fidgety that others notice Not at all   Thoughts of being better off dead, or hurting yourself in some way Not at all   PHQ 9 Score 0   How difficult have these problems made it for you to do your work, take care of your home and get along with others Not difficult at all       Alcohol Risk Factor Screening:     Alcohol Risk Factor Screening:   Do you average 1 drink per night or more than 7 drinks a week:  No    On any one occasion in the past three months have you have had more than 3 drinks containing alcohol:  No    Functional Ability and Level of Safety:   Hearing Loss  Hearing is good. Activities of Daily Living  ADL Assessment 9/3/2020   Feeding yourself No Help Needed   Getting from bed to chair No Help Needed   Getting dressed No Help Needed   Bathing or showering No Help Needed   Walk across the room (includes cane/walker) No Help Needed   Using the telphone No Help Needed   Taking your medications No Help Needed   Preparing meals No Help Needed   Managing money (expenses/bills) No Help Needed   Moderately strenuous housework (laundry) No Help Needed   Shopping for personal items (toiletries/medicines) No Help Needed   Shopping for groceries No Help Needed   Driving No Help Needed   Climbing a flight of stairs No Help Needed   Getting to places beyond walking distances No Help Needed       Fall Risk  Fall Risk Assessment, last 12 mths 9/3/2020   Able to walk? Yes   Fall in past 12 months? No       Abuse Screen  Abuse Screening Questionnaire 9/3/2020   Do you ever feel afraid of your partner?  N   Are you in a relationship with someone who physically or mentally threatens you? N   Is it safe for you to go home? Y       Cognitive Screening   Evaluation of Cognitive Function:  Has your family/caregiver stated any concerns about your memory: no    Physical Exam     Visit Vitals  /78 (BP 1 Location: Left arm, BP Patient Position: Sitting)   Pulse 65   Temp 97.7 °F (36.5 °C) (Oral)   Resp 16   Ht 5' 7\" (1.702 m)   Wt 125 lb 9.6 oz (57 kg)   SpO2 95%   BMI 19.67 kg/m²     Body mass index is 19.67 kg/m². General appearance - alert, well appearing, and in no distress  Mental status - alert, oriented to person, place, and time  EYE-ESTRADA, EOMI,conjunctiva normal bilaterally, lids normal  ENT-ENT exam normal, no neck nodes or sinus tenderness  Nose - normal and patent, no erythema,  Or discharge   Mouth - mucous membranes moist, pharynx normal without lesions  Neck - supple, no significant adenopathy or bruit  Chest - clear to auscultation, no wheezes, rales or rhonchi. Heart - normal rate, regular rhythm, normal S1, S2, no murmurs, rubs, clicks or gallops   Abdomen - soft, nontender, nondistended, no masses or organomegaly  Lymph- no adenopathy palpable  Ext-peripheral pulses normal, no pedal edema, no clubbing or cyanosis  Skin-Warm and dry. no hyperpigmentation, vitiligo, or suspicious lesions  Neuro -alert, oriented, normal speech, no focal findings or movement disorder noted    Assessment/Plan   [place] education and counseling provided:  Age appropriate evidence-based preventive care recommendations based on today's review and evaluation; including relevant cancer screening guidelines, and vaccination recommendations. An After Visit Summary was printed and given to the patient which information about these guidelines, and a personalized schedule for health maintenance items. Whe appropriate and with patient agreement, orders noted below were placed to complete missing health maintenance items.       Additional Plan for follow up chronic medical conditions includes:  Diagnoses and all orders for this visit:    Initial Medicare annual wellness visit    ASCVD (arteriosclerotic cardiovascular disease)  -     COLLECTION VENOUS BLOOD,VENIPUNCTURE  -     CBC WITH AUTOMATED DIFF  -     LIPID PANEL  -     METABOLIC PANEL, COMPREHENSIVE  -     TSH 3RD GENERATION    Heterozygous factor V Leiden mutation (HCC)    Irritable bowel syndrome, unspecified type    Diverticulosis of large intestine without hemorrhage    Vestibular neuronitis of left ear    Need for hepatitis C screening test  -     HEPATITIS C AB          Other instructions: The patient is currently not on medication. Have recommended continued GI follow-up in regards to her GI symptoms    Because of positive coronary calcium score, nonspecific GI symptoms and early coronary death in his son I have recommended follow-up with cardiology for stress testing    Health maintenance issues were reviewed and she is up-to-date on colonoscopy. She has previously had a bone density done by her gynecologist in the past.    Age-appropriate vaccinations were reviewed including influenza, pneumococcal, Tdap and shingles which she declined all of them. Await results of multiple labs    Follow-up yearly    Follow-up and Dispositions    · Return in about 1 year (around 9/3/2021). I have reviewed with the patient details of the assessment and plan and all questions were answered. Relevent patient education was performed. The most recent lab findings were reviewed with the patient. Donnita Goldmann, MD    Please note that this dictation was completed with Meez, the computer voice recognition software. Quite often unanticipated grammatical, syntax, homophones, and other interpretive errors are inadvertently transcribed by the computer software. Please disregard these errors. Please excuse any errors that have escaped final proofreading.

## 2020-09-03 NOTE — PROGRESS NOTES
Chief Complaint   Patient presents with    Annual Wellness Visit       Depression Risk Factor Screening:     3 most recent PHQ Screens 9/3/2020   Little interest or pleasure in doing things Not at all   Feeling down, depressed, irritable, or hopeless Not at all   Total Score PHQ 2 0   Trouble falling or staying asleep, or sleeping too much Not at all   Feeling tired or having little energy Not at all   Poor appetite, weight loss, or overeating Not at all   Feeling bad about yourself - or that you are a failure or have let yourself or your family down Not at all   Trouble concentrating on things such as school, work, reading, or watching TV Not at all   Moving or speaking so slowly that other people could have noticed; or the opposite being so fidgety that others notice Not at all   Thoughts of being better off dead, or hurting yourself in some way Not at all   PHQ 9 Score 0   How difficult have these problems made it for you to do your work, take care of your home and get along with others Not difficult at all       Functional Ability and Level of Safety:     Activities of Daily Living  ADL Assessment 9/3/2020   Feeding yourself No Help Needed   Getting from bed to chair No Help Needed   Getting dressed No Help Needed   Bathing or showering No Help Needed   Walk across the room (includes cane/walker) No Help Needed   Using the telphone No Help Needed   Taking your medications No Help Needed   Preparing meals No Help Needed   Managing money (expenses/bills) No Help Needed   Moderately strenuous housework (laundry) No Help Needed   Shopping for personal items (toiletries/medicines) No Help Needed   Shopping for groceries No Help Needed   Driving No Help Needed   Climbing a flight of stairs No Help Needed   Getting to places beyond walking distances No Help Needed       Fall Risk  Fall Risk Assessment, last 12 mths 9/3/2020   Able to walk? Yes   Fall in past 12 months?  No       Abuse Screen  Abuse Screening Questionnaire 9/3/2020   Do you ever feel afraid of your partner? N   Are you in a relationship with someone who physically or mentally threatens you? N   Is it safe for you to go home?  Y         Patient Care Team   Patient Care Team:  Parviz Lewis MD as PCP - General (Internal Medicine)  Parviz Lewis MD as PCP [facility]

## 2020-09-03 NOTE — PATIENT INSTRUCTIONS
The best way to stay healthy is to live a healthy lifestyle. A healthy lifestyle includes regular exercise, eating a well-balanced diet, keeping a healthy weight and not smoking. Regular physical exams and screening tests are another important way to take care of yourself. Preventive exams provided by health care providers can find health problems early when treatment works best and can keep you from getting certain diseases or illnesses. Preventive services include exams, lab tests, screenings, shots, monitoring and information to help you take care of your own health. All people over 65 should have a pneumonia shot. Pneumonia shots are usually only needed once in a lifetime unless your doctor decides differently. In addition to your physical exam, some screening tests are recommended: 
 
All people over 65 should have a yearly flu shot. People over 65 are at medium to high risk for Hepatitis B. Three shots are needed for complete protection. Bone mass measurement (dexa scan) is recommended every two years. Diabetes Mellitus screening is recommended every year. Glaucoma is an eye disease caused by high pressure in the eye. An eye exam is recommended every year. Cardiovascular screening tests that check your cholesterol and other blood fat (lipid) levels are recommended every five years. Colorectal Cancer screening tests help to find pre-cancerous polyps (growths in the colon) so they can be removed before they turn into cancer. Tests ordered for screening depend on your personal and family history risk factors. Prostate Cancer Screening (annually up to age 76) Screening for breast cancer is recommended yearly with a Mammogram. 
 
Screening for cervical and vaginal cancer is recommended with a pelvic and Pap test every two years.  However if you have had an abnormal pap in the past  three years or at high risk for cervical or vaginal cancer Medicare will cover a pap test and a pelvic exam every year. Here is a list of your current Health Maintenance items with a due date: 
Health Maintenance Due Topic Date Due  
 Hepatitis C Test  1950  DTaP/Tdap/Td  (1 - Tdap) 09/26/1973  Shingles Vaccine (1 of 2) 09/26/2002  Colon Cancer Stool Test  09/26/2002  Glaucoma Screening   09/26/2017  Bone Mineral Density   09/26/2017  Pneumococcal Vaccine (1 of 1 - PPSV23) 09/26/2017 [address] [place] [address] Annual Well Visit  03/14/2018  Yearly Flu Vaccine (1) 09/01/2020 Learning About Coronary Artery Disease (CAD) What is coronary artery disease? Coronary artery disease (CAD) occurs when plaque builds up in the arteries that bring oxygen-rich blood to your heart. Plaque is a fatty substance made of cholesterol, calcium, and other substances in the blood. This process is called hardening of the arteries, or atherosclerosis. What happens when you have coronary artery disease? · Plaque may narrow the coronary arteries. Narrowed arteries cause poor blood flow. This can lead to angina symptoms such as chest pain or discomfort. If blood flow is completely blocked, you could have a heart attack. · You can slow CAD and reduce the risk of future problems by making changes in your lifestyle. These include quitting smoking and eating heart-healthy foods. · Treatments for CAD, along with changes in your lifestyle, can help you live a longer and healthier life. How can you prevent coronary artery disease? · Do not smoke. It may be the best thing you can do to prevent heart disease. If you need help quitting, talk to your doctor about stop-smoking programs and medicines. These can increase your chances of quitting for good. · Be active. Get at least 30 minutes of exercise on most days of the week. Walking is a good choice. You also may want to do other activities, such as running, swimming, cycling, or playing tennis or team sports. · Eat heart-healthy foods. Eat more fruits and vegetables and less foods that contain saturated and trans fats. Limit alcohol, sodium, and sweets. · Stay at a healthy weight. Lose weight if you need to. · Manage other health problems such as diabetes, high blood pressure, and high cholesterol. How is coronary artery disease treated? · Your doctor will suggest that you make lifestyle changes. For example, your doctor may ask you to eat healthy foods, quit smoking, lose extra weight, and be more active. · You will have to take medicines. · Your doctor may suggest a procedure to open narrowed or blocked arteries. This is called angioplasty. Or your doctor may suggest using healthy blood vessels to create detours around narrowed or blocked arteries. This is called bypass surgery. Follow-up care is a key part of your treatment and safety. Be sure to make and go to all appointments, and call your doctor if you are having problems. It's also a good idea to know your test results and keep a list of the medicines you take. Where can you learn more? Go to http://www.gray.com/ Enter ([phone] in the search box to learn more about \"Learning About Coronary Artery Disease (CAD). \" Current as of: December 16, 2019               Content Version: 12.5 © 7278-8993 [employer]. Care instructions adapted under license by [employer] (which disclaims liability or warranty for this information). If you have questions about a medical condition or this instruction, always ask your healthcare professional. [place] [address] any warranty or liability for your use of this information.

## 2020-09-04 LAB
BASOPHILS # BLD AUTO: 0 X10E3/UL (ref 0–0.2)
BASOPHILS NFR BLD AUTO: 0 %
EOSINOPHIL # BLD AUTO: 0.1 X10E3/UL (ref 0–0.4)
EOSINOPHIL NFR BLD AUTO: 1 %
ERYTHROCYTE [DISTWIDTH] IN BLOOD BY AUTOMATED COUNT: 12.3 % (ref 11.7–15.4)
HCT VFR BLD AUTO: 47.8 % (ref 34–46.6)
HCV AB S/CO SERPL IA: <0.1 S/CO RATIO (ref 0–0.9)
HGB BLD-MCNC: 15.2 G/DL (ref 11.1–15.9)
IMM GRANULOCYTES # BLD AUTO: 0 X10E3/UL (ref 0–0.1)
IMM GRANULOCYTES NFR BLD AUTO: 0 %
LYMPHOCYTES # BLD AUTO: 1.4 X10E3/UL (ref 0.7–3.1)
LYMPHOCYTES NFR BLD AUTO: 23 %
MCH RBC QN AUTO: 29.6 PG (ref 26.6–33)
MCHC RBC AUTO-ENTMCNC: 31.8 G/DL (ref 31.5–35.7)
MCV RBC AUTO: 93 FL (ref 79–97)
MONOCYTES # BLD AUTO: 0.4 X10E3/UL (ref 0.1–0.9)
MONOCYTES NFR BLD AUTO: 7 %
NEUTROPHILS # BLD AUTO: 4.1 X10E3/UL (ref 1.4–7)
NEUTROPHILS NFR BLD AUTO: 69 %
PLATELET # BLD AUTO: 268 X10E3/UL (ref 150–450)
RBC # BLD AUTO: 5.13 X10E6/UL (ref 3.77–5.28)
WBC # BLD AUTO: 6 X10E3/UL (ref 3.4–10.8)

## 2020-09-04 RX ORDER — ROSUVASTATIN CALCIUM 5 MG/1
5 TABLET, COATED ORAL
Qty: 30 TAB | Refills: 1 | Status: SHIPPED | OUTPATIENT
Start: 2020-09-04 | End: 2021-07-04

## 2020-09-04 NOTE — PROGRESS NOTES
Verified two patient identifiers, name and . Patient provided with results. Patient stated they understood the information and has no questions at this time.

## 2020-09-04 NOTE — TELEPHONE ENCOUNTER
Spoke to patient and she would like the medication sent [place] [facility] [address] and [facility] [employer]

## 2020-09-04 NOTE — PROGRESS NOTES
Labs OK except LDL cholesterol high at 144.  Considering family history would start crestor 5 mg daily with FLP, CPK and LFT's obtained in 1 month

## 2020-09-14 ENCOUNTER — OFFICE VISIT (OUTPATIENT)
Dept: SURGERY | Age: 70
End: 2020-09-14
Payer: MEDICARE

## 2020-09-14 VITALS
TEMPERATURE: 98.2 F | BODY MASS INDEX: 19.46 KG/M2 | OXYGEN SATURATION: 98 % | HEIGHT: 67 IN | WEIGHT: 124 LBS | HEART RATE: 98 BPM

## 2020-09-14 DIAGNOSIS — K82.8 BILIARY DYSKINESIA: Primary | ICD-10-CM

## 2020-09-14 PROCEDURE — G8536 NO DOC ELDER MAL SCRN: HCPCS | Performed by: SURGERY

## 2020-09-14 PROCEDURE — 1101F PT FALLS ASSESS-DOCD LE1/YR: CPT | Performed by: SURGERY

## 2020-09-14 PROCEDURE — G8400 PT W/DXA NO RESULTS DOC: HCPCS | Performed by: SURGERY

## 2020-09-14 PROCEDURE — G8420 CALC BMI NORM PARAMETERS: HCPCS | Performed by: SURGERY

## 2020-09-14 PROCEDURE — G9717 DOC PT DX DEP/BP F/U NT REQ: HCPCS | Performed by: SURGERY

## 2020-09-14 PROCEDURE — G9899 SCRN MAM PERF RSLTS DOC: HCPCS | Performed by: SURGERY

## 2020-09-14 PROCEDURE — 99203 OFFICE O/P NEW LOW 30 MIN: CPT | Performed by: SURGERY

## 2020-09-14 PROCEDURE — 3017F COLORECTAL CA SCREEN DOC REV: CPT | Performed by: SURGERY

## 2020-09-14 PROCEDURE — G8427 DOCREV CUR MEDS BY ELIG CLIN: HCPCS | Performed by: SURGERY

## 2020-09-14 PROCEDURE — 1090F PRES/ABSN URINE INCON ASSESS: CPT | Performed by: SURGERY

## 2020-09-14 NOTE — Clinical Note
9/24/20 Patient: Crista Tabares YOB: 1950 Date of Visit: 9/14/2020 MD [address] [address] VIA In Basket Lisa Hazel MD 
[address] [address] VIA Facsimile: [phone] Dear MD Lisa Canales MD, Thank you for referring Ms. Linda Moore to  [facility]  for evaluation. My notes for this consultation are attached. If you have questions, please do not hesitate to call me. I look forward to following your patient along with you.  
 
 
Sincerely, 
 
Rosamaria Calvillo MD

## 2020-09-14 NOTE — PROGRESS NOTES
Chief Complaint   Patient presents with    Abdominal Pain     seen at the request of Dr. Wilfredo Sanderson eval gallbladder

## 2020-09-24 NOTE — PROGRESS NOTES
To: Dannie Ley MD CC:  Zuleyma Robert MD      From: Stefani Singh MD    Thank you for sending Geovanna Pagan to see us. Please note that this dictation was completed with [employer], the computer voice recognition software. Quite often unanticipated grammatical, syntax, homophones, and other interpretive errors are inadvertently transcribed by the software. Please disregard these errors. Please excuse any errors that have escaped final proofreading. Encounter Date: 9/14/2020  History and Physical    Assessment:   Biliary dyskinesia. And with ejection fraction of 3%. She did not experience symptoms after CCK injection. Her epigastric discomfort has improved with other interventions including Carafate and famotidine. Body mass index is 19.42 kg/m². Plan/Recommendations/Medical Decision Making: We discussed the fact that clearly her gallbladder is not functioning properly. She is not presently having classic symptoms of biliary dyskinesia, however. The epigastric discomfort has mostly resolved after Carafate and famotidine. She did not have reproduction of her symptoms with CCK during HIDA scan. She presently denies nausea, vomiting, bloating. We discussed that there may be some degree of underlying chronic cholecystitis, but that there does not appear to be any component of acute cholecystitis. She does not have gallstones on her ultrasound and therefore her risk of developing acute cholecystitis is quite low. After this discussion, she wants to give it all some thought and see how her symptoms do. She will get back to us if she wishes to pursue cholecystectomy. I gave her our phone number. HPI:   Patient is a 81 y.o. female who is seen in consultation at the request of Dr. Ghada Lynn. She has had varied gastrointestinal issues for some time. She has had some difficulty swallowing with throat pain, epigastric discomfort, and occasional nausea.   She has undergone upper endoscopy, manometry, pH study, ultrasound, and HIDA scan. She is recently been treated with Carafate and famotidine but is off both of them now. This seemed to help with the epigastric discomfort and nausea. She tells me that she did not have any pain during the HIDA scan even after administration of CCK. She has been eating well and has not been having postprandial symptoms. Past Medical History:   Diagnosis Date    ASCVD (arteriosclerotic cardiovascular disease) 10/1/2017    CAD (coronary artery disease)     Depression 10/6/2017    Diverticulosis of intestine without bleeding 10/6/2017    With diverticulitis    Heterozygous factor V Leiden mutation ([facility] [address] 10/6/2017    Hyperlipidemia 10/1/2017    IBS (irritable bowel syndrome) 10/6/2017    Insomnia 1/16/2018    Throat pain 7/23/2020    Vestibular neuronitis of both ears 10/3/2017      Past Surgical History:   Procedure Laterality Date    HX GI      hemmorhoidectomy    [place] EGD DELIVER THERMAL ENERGY SPHNCTR/CARDIA GERD  2017    UPPER GI ENDOSCOPY,BIOPSY  7/23/2020          Social History     Tobacco Use    Smoking status: Never Smoker    Smokeless tobacco: Never Used   Substance Use Topics    Alcohol use: No      Family History   Problem Relation Age of Onset    No Known Problems Mother     Heart Disease Father     Sudden Death Other     Cancer Sister         breast       Current Outpatient Medications   Medication Sig    rosuvastatin (CRESTOR) 5 mg tablet Take 1 Tab by mouth nightly. No current facility-administered medications for this visit. Allergies:  No Known Allergies     Review of Systems:  10 systems reviewed. See scanned sheet in \"Media\" section. See HPI for pertinent positives and negatives.       Objective:     Visit Vitals  Pulse 98   Temp 98.2 °F (36.8 °C)   Ht 5' 7\" (1.702 m)   Wt 56.2 kg (124 lb)   SpO2 98%   BMI 19.42 kg/m²     General appearance  Alert, cooperative, no distress, appears stated age   [de-identified] Anicteric   Neck Supple   Back   No CVA tenderness   Lungs   CTAB   Heart  Regular rate and rhythm. Abdomen   Soft. Bowel sounds normal. No palpable masses. Presently nontender. Extremities No CCE.    Pulses 2+ right radial   Skin Skin color, texture, turgor normal.        Neurologic Without overt sensory or motor deficit     Imaging and Lab Review:   images and reports reviewed    Signed By: Reagan Beltre MD     September 24, 2020

## 2020-10-05 ENCOUNTER — LAB ONLY (OUTPATIENT)
Dept: INTERNAL MEDICINE CLINIC | Age: 70
End: 2020-10-05
Payer: MEDICARE

## 2020-10-05 DIAGNOSIS — E78.00 HYPERCHOLESTEREMIA: Primary | ICD-10-CM

## 2020-10-05 LAB
CHOL/HDL RATIO,CHHD: 2 RATIO (ref 0–4)
CHOLEST SERPL-MCNC: 153 MG/DL (ref 0–200)
CK SERPL-CCNC: 87 U/L (ref 30–135)
HDLC SERPL-MCNC: 72 MG/DL (ref 35–130)
LDL/HDL RATIO,LDHD: 1 RATIO
LDLC SERPL CALC-MCNC: 70 MG/DL (ref 0–130)
TRIGL SERPL-MCNC: 57 MG/DL (ref 0–200)
VLDLC SERPL CALC-MCNC: 11 MG/DL

## 2020-10-05 PROCEDURE — 80061 LIPID PANEL: CPT | Performed by: INTERNAL MEDICINE

## 2020-10-05 PROCEDURE — 82550 ASSAY OF CK (CPK): CPT | Performed by: INTERNAL MEDICINE

## 2020-10-06 LAB
ALBUMIN SERPL-MCNC: 4.3 G/DL (ref 3.8–4.8)
ALP SERPL-CCNC: 54 IU/L (ref 39–117)
ALT SERPL-CCNC: 15 IU/L (ref 0–32)
AST SERPL-CCNC: 20 IU/L (ref 0–40)
BILIRUB DIRECT SERPL-MCNC: 0.12 MG/DL (ref 0–0.4)
BILIRUB SERPL-MCNC: 0.4 MG/DL (ref 0–1.2)
PROT SERPL-MCNC: 6.4 G/DL (ref 6–8.5)

## 2020-12-31 ENCOUNTER — OFFICE VISIT (OUTPATIENT)
Dept: INTERNAL MEDICINE CLINIC | Age: 70
End: 2020-12-31
Payer: MEDICARE

## 2020-12-31 ENCOUNTER — TELEPHONE (OUTPATIENT)
Dept: INTERNAL MEDICINE CLINIC | Age: 70
End: 2020-12-31

## 2020-12-31 VITALS
OXYGEN SATURATION: 99 % | TEMPERATURE: 98.6 F | BODY MASS INDEX: 19.9 KG/M2 | HEART RATE: 65 BPM | SYSTOLIC BLOOD PRESSURE: 116 MMHG | DIASTOLIC BLOOD PRESSURE: 70 MMHG | RESPIRATION RATE: 16 BRPM | HEIGHT: 67 IN | WEIGHT: 126.8 LBS

## 2020-12-31 DIAGNOSIS — N39.0 URINARY TRACT INFECTION WITHOUT HEMATURIA, SITE UNSPECIFIED: Primary | ICD-10-CM

## 2020-12-31 LAB
BACTERIA,BACTU: ABNORMAL
BILIRUB UR QL: NEGATIVE
CLARITY: CLEAR
COLOR UR: ABNORMAL
GLUCOSE 24H UR-MRATE: NEGATIVE G/(24.H)
HGB UR QL STRIP: ABNORMAL
KETONES UR QL STRIP.AUTO: NEGATIVE
LEUKOCYTE ESTERASE: ABNORMAL
NITRITE UR QL STRIP.AUTO: NEGATIVE
PH UR STRIP: 6 [PH] (ref 5–7)
PROT UR STRIP-MCNC: ABNORMAL MG/DL
RBC #/AREA URNS HPF: ABNORMAL #/HPF
SP GR UR REFRACTOMETRY: 1.01 (ref 1–1.03)
SQUAMOUS EPITHELIAL CELLS: ABNORMAL
UROBILINOGEN UR QL STRIP.AUTO: NEGATIVE
WBC URNS QL MICRO: ABNORMAL #/HPF

## 2020-12-31 PROCEDURE — 1090F PRES/ABSN URINE INCON ASSESS: CPT | Performed by: INTERNAL MEDICINE

## 2020-12-31 PROCEDURE — 3017F COLORECTAL CA SCREEN DOC REV: CPT | Performed by: INTERNAL MEDICINE

## 2020-12-31 PROCEDURE — G9717 DOC PT DX DEP/BP F/U NT REQ: HCPCS | Performed by: INTERNAL MEDICINE

## 2020-12-31 PROCEDURE — 99213 OFFICE O/P EST LOW 20 MIN: CPT | Performed by: INTERNAL MEDICINE

## 2020-12-31 PROCEDURE — G9899 SCRN MAM PERF RSLTS DOC: HCPCS | Performed by: INTERNAL MEDICINE

## 2020-12-31 PROCEDURE — G8427 DOCREV CUR MEDS BY ELIG CLIN: HCPCS | Performed by: INTERNAL MEDICINE

## 2020-12-31 PROCEDURE — 81001 URINALYSIS AUTO W/SCOPE: CPT | Performed by: INTERNAL MEDICINE

## 2020-12-31 PROCEDURE — 1101F PT FALLS ASSESS-DOCD LE1/YR: CPT | Performed by: INTERNAL MEDICINE

## 2020-12-31 PROCEDURE — G8420 CALC BMI NORM PARAMETERS: HCPCS | Performed by: INTERNAL MEDICINE

## 2020-12-31 PROCEDURE — G8400 PT W/DXA NO RESULTS DOC: HCPCS | Performed by: INTERNAL MEDICINE

## 2020-12-31 PROCEDURE — G8536 NO DOC ELDER MAL SCRN: HCPCS | Performed by: INTERNAL MEDICINE

## 2020-12-31 RX ORDER — CEFUROXIME AXETIL 250 MG/1
250 TABLET ORAL 2 TIMES DAILY
Qty: 14 TAB | Refills: 0 | Status: SHIPPED | OUTPATIENT
Start: 2020-12-31

## 2020-12-31 NOTE — PROGRESS NOTES
Subjective: The patient presents to the office with complaints of a possible UTI. Complains of dysuria, frequency and urgency ongoing for 3 days. Denies hematuria, abdominal pain or flank pain. No fever, chills, nausea or vomiting. Past Medical History:   Diagnosis Date    ASCVD (arteriosclerotic cardiovascular disease) 10/1/2017    CAD (coronary artery disease)     Depression 10/6/2017    Diverticulosis of intestine without bleeding 10/6/2017    With diverticulitis    Heterozygous factor V Leiden mutation ([address] 10/6/2017    Hyperlipidemia 10/1/2017    IBS (irritable bowel syndrome) 10/6/2017    Insomnia 1/16/2018    Throat pain 7/23/2020    Vestibular neuronitis of both ears 10/3/2017     Past Surgical History:   Procedure Laterality Date    HX GI      hemmorhoidectomy    [place] EGD DELIVER THERMAL ENERGY SPHNCTR/CARDIA GERD  2017    UPPER GI ENDOSCOPY,BIOPSY  7/23/2020          No Known Allergies  Current Outpatient Medications   Medication Sig Dispense Refill    cefUROXime (CEFTIN) 250 mg tablet Take 1 Tab by mouth two (2) times a day. 14 Tab 0    rosuvastatin (CRESTOR) 5 mg tablet Take 1 Tab by mouth nightly.  27 Tab 1     Social History     Socioeconomic History    Marital status:      Spouse name: Not on file    Number of children: Not on file    Years of education: Not on file    Highest education level: Not on file   Tobacco Use    Smoking status: Never Smoker    Smokeless tobacco: Never Used   Substance and Sexual Activity    Alcohol use: No    Drug use: No    Sexual activity: Never     Family History   Problem Relation Age of Onset    No Known Problems Mother     Heart Disease Father     Sudden Death Other     Cancer Sister         breast       Review of Systems:  GEN: no fever,chills or sweats  CV: no PND, orthopnea, or chest pain  Resp: no dyspnea on exertion, no cough  Abd: no nausea, vomiting or diarrhea or abdominal pain  Back: denies flank pain  : positive for dysuria, frequency and urgency  Neurological ROS: no TIA or stroke symptoms  ROS otherwise negative      Objective:     Visit Vitals  /70 (BP 1 Location: Right arm, BP Patient Position: Sitting)   Pulse 65   Temp 98.6 °F (37 °C) (Oral)   Resp 16   Ht 5' 7\" (1.702 m)   Wt 126 lb 12.8 oz (57.5 kg)   SpO2 99%   BMI 19.86 kg/m²     Body mass index is 19.86 kg/m². General:   alert, cooperative and no distress   Skin: No rash appreciated   Neck: Supple   Heart: RRR without murmur   Lungs: Clear to auscultation bilaterally   Abdomen: Soft, nontender. Normal bowel sounds   Back: No CVAT present   Neuro: No focal deficits appreciated     Physical exam otherwise negative         Assessment/Plan:     Diagnoses and all orders for this visit:    Urinary tract infection without hematuria, site unspecified  -     URINALYSIS W/MICROSCOPIC  -     CULTURE, URINE  -     cefUROXime (CEFTIN) 250 mg tablet; Take 1 Tab by mouth two (2) times a day., Normal, Disp-14 Tab, R-0        Other instructions:   Await results of urine culture    Azo-standard as needed for discomfort    Increase po fluids    Follow-up and Dispositions    · Return if symptoms worsen or fail to improve. Kota Hendricks MD    Please note that this dictation was completed with [employer], the computer voice recognition software. Quite often unanticipated grammatical, syntax, homophones, and other interpretive errors are inadvertently transcribed by the computer software. Please disregard these errors. Please excuse any errors that have escaped final proofreading.

## 2020-12-31 NOTE — PROGRESS NOTES
Shelbi Trammell is a 78 y.o. female presenting for Bladder Infection  . 1. Have you been to the ER, urgent care clinic since your last visit? Hospitalized since your last visit? No    2. Have you seen or consulted any other health care providers outside of the [address] [place] [address] since your last visit? Include any pap smears or colon screening. No    Fall Risk Assessment, last 12 mths 9/14/2020   Able to walk? Yes   Fall in past 12 months? No         Abuse Screening Questionnaire 9/3/2020   Do you ever feel afraid of your partner? N   Are you in a relationship with someone who physically or mentally threatens you? N   Is it safe for you to go home? Y       3 most recent PHQ Screens 9/3/2020   Little interest or pleasure in doing things Not at all   Feeling down, depressed, irritable, or hopeless Not at all   Total Score PHQ 2 0   Trouble falling or staying asleep, or sleeping too much Not at all   Feeling tired or having little energy Not at all   Poor appetite, weight loss, or overeating Not at all   Feeling bad about yourself - or that you are a failure or have let yourself or your family down Not at all   Trouble concentrating on things such as school, work, reading, or watching TV Not at all   Moving or speaking so slowly that other people could have noticed; or the opposite being so fidgety that others notice Not at all   Thoughts of being better off dead, or hurting yourself in some way Not at all   PHQ 9 Score 0   How difficult have these problems made it for you to do your work, take care of your home and get along with others Not difficult at all       There are no discontinued medications.

## 2021-01-02 LAB — BACTERIA UR CULT: ABNORMAL

## 2021-07-04 RX ORDER — ROSUVASTATIN CALCIUM 5 MG/1
TABLET, COATED ORAL
Qty: 30 TABLET | Refills: 1 | Status: SHIPPED | OUTPATIENT
Start: 2021-07-04

## 2021-09-08 ENCOUNTER — HOSPITAL ENCOUNTER (EMERGENCY)
Age: 71
Discharge: HOME | End: 2021-09-09
Attending: EMERGENCY MEDICINE
Payer: MEDICARE

## 2021-09-08 ENCOUNTER — APPOINTMENT (OUTPATIENT)
Dept: GENERAL RADIOLOGY | Age: 71
End: 2021-09-08
Attending: EMERGENCY MEDICINE
Payer: MEDICARE

## 2021-09-08 VITALS
HEART RATE: 75 BPM | SYSTOLIC BLOOD PRESSURE: 161 MMHG | OXYGEN SATURATION: 98 % | BODY MASS INDEX: 20.24 KG/M2 | RESPIRATION RATE: 18 BRPM | WEIGHT: 128.97 LBS | TEMPERATURE: 98.4 F | HEIGHT: 67 IN | DIASTOLIC BLOOD PRESSURE: 120 MMHG

## 2021-09-08 DIAGNOSIS — I49.3 PVC (PREMATURE VENTRICULAR CONTRACTION): ICD-10-CM

## 2021-09-08 DIAGNOSIS — R00.2 PALPITATIONS: Primary | ICD-10-CM

## 2021-09-08 LAB
ALBUMIN SERPL-MCNC: 4.2 G/DL (ref 3.5–5)
ALBUMIN/GLOB SERPL: 1.1 {RATIO} (ref 1.1–2.2)
ALP SERPL-CCNC: 61 U/L (ref 45–117)
ALT SERPL-CCNC: 26 U/L (ref 12–78)
ANION GAP SERPL CALC-SCNC: 5 MMOL/L (ref 5–15)
AST SERPL-CCNC: 24 U/L (ref 15–37)
BASOPHILS # BLD: 0 K/UL (ref 0–0.1)
BASOPHILS NFR BLD: 0 % (ref 0–1)
BILIRUB SERPL-MCNC: 0.6 MG/DL (ref 0.2–1)
BUN SERPL-MCNC: 21 MG/DL (ref 6–20)
BUN/CREAT SERPL: 24 (ref 12–20)
CALCIUM SERPL-MCNC: 9.1 MG/DL (ref 8.5–10.1)
CHLORIDE SERPL-SCNC: 104 MMOL/L (ref 97–108)
CO2 SERPL-SCNC: 30 MMOL/L (ref 21–32)
CREAT SERPL-MCNC: 0.88 MG/DL (ref 0.55–1.02)
DIFFERENTIAL METHOD BLD: NORMAL
EOSINOPHIL # BLD: 0.1 K/UL (ref 0–0.4)
EOSINOPHIL NFR BLD: 1 % (ref 0–7)
ERYTHROCYTE [DISTWIDTH] IN BLOOD BY AUTOMATED COUNT: 12.3 % (ref 11.5–14.5)
GLOBULIN SER CALC-MCNC: 3.7 G/DL (ref 2–4)
GLUCOSE SERPL-MCNC: 119 MG/DL (ref 65–100)
HCT VFR BLD AUTO: 43.9 % (ref 35–47)
HGB BLD-MCNC: 14 G/DL (ref 11.5–16)
IMM GRANULOCYTES # BLD AUTO: 0 K/UL (ref 0–0.04)
IMM GRANULOCYTES NFR BLD AUTO: 0 % (ref 0–0.5)
LYMPHOCYTES # BLD: 1.5 K/UL (ref 0.8–3.5)
LYMPHOCYTES NFR BLD: 28 % (ref 12–49)
MCH RBC QN AUTO: 29.9 PG (ref 26–34)
MCHC RBC AUTO-ENTMCNC: 31.9 G/DL (ref 30–36.5)
MCV RBC AUTO: 93.6 FL (ref 80–99)
MONOCYTES # BLD: 0.5 K/UL (ref 0–1)
MONOCYTES NFR BLD: 8 % (ref 5–13)
NEUTS SEG # BLD: 3.4 K/UL (ref 1.8–8)
NEUTS SEG NFR BLD: 63 % (ref 32–75)
NRBC # BLD: 0 K/UL (ref 0–0.01)
NRBC BLD-RTO: 0 PER 100 WBC
PLATELET # BLD AUTO: 238 K/UL (ref 150–400)
PMV BLD AUTO: 9.2 FL (ref 8.9–12.9)
POTASSIUM SERPL-SCNC: 3.9 MMOL/L (ref 3.5–5.1)
PROT SERPL-MCNC: 7.9 G/DL (ref 6.4–8.2)
RBC # BLD AUTO: 4.69 M/UL (ref 3.8–5.2)
SODIUM SERPL-SCNC: 139 MMOL/L (ref 136–145)
TROPONIN I SERPL-MCNC: <0.05 NG/ML
WBC # BLD AUTO: 5.5 K/UL (ref 3.6–11)

## 2021-09-08 PROCEDURE — 99283 EMERGENCY DEPT VISIT LOW MDM: CPT

## 2021-09-08 PROCEDURE — 80053 COMPREHEN METABOLIC PANEL: CPT

## 2021-09-08 PROCEDURE — 84484 ASSAY OF TROPONIN QUANT: CPT

## 2021-09-08 PROCEDURE — 93005 ELECTROCARDIOGRAM TRACING: CPT

## 2021-09-08 PROCEDURE — 85025 COMPLETE CBC W/AUTO DIFF WBC: CPT

## 2021-09-08 PROCEDURE — 36415 COLL VENOUS BLD VENIPUNCTURE: CPT

## 2021-09-09 LAB
ATRIAL RATE: 72 BPM
CALCULATED P AXIS, ECG09: 69 DEGREES
CALCULATED R AXIS, ECG10: 6 DEGREES
CALCULATED T AXIS, ECG11: 39 DEGREES
DIAGNOSIS, 93000: NORMAL
P-R INTERVAL, ECG05: 122 MS
Q-T INTERVAL, ECG07: 406 MS
QRS DURATION, ECG06: 96 MS
QTC CALCULATION (BEZET), ECG08: 444 MS
VENTRICULAR RATE, ECG03: 72 BPM

## 2021-09-09 NOTE — ED PROVIDER NOTES
EMERGENCY DEPARTMENT HISTORY AND PHYSICAL EXAM      Date: 9/8/2021  Patient Name: Rosa Isela Blue    Please note that this dictation was completed with [employer], the computer voice recognition software. Quite often unanticipated grammatical, syntax, homophones, and other interpretive errors are inadvertently transcribed by the computer software. Please disregard these errors. Please excuse any errors that have escaped final proofreading. History of Presenting Illness     Chief Complaint   Patient presents with    Headache     pt reports chronic issues w/ vertigo but has had a cont heavy feeling in her head and a pounding in her chest x1 week. pt has a large family hx of cardiac issues. has no seen cards in 4 years       History Provided By: Patient     HPI: Rosa Isela Blue, 78 y.o. female, with a past medical history significant for chronic vertigo presents to the emergency department complaining of intermittent episodes of a feeling of heavy heartbeat and pressure in her head. She states this is been going on intermittently for several weeks. She notices it more at night, but it can come on at any time. She denies any fevers or chills. Denies any chest pain. Denies any shortness of breath. Nothing makes her symptoms better or worse. She states when she gets the feeling in her chest she will also worsening feeling of pressure in her head. She has a family history of coronary artery disease. No prior history of CAD for herself. She is treated for hypertension hyperlipidemia    PCP: Noble Nix MD    No current facility-administered medications on file prior to encounter. Current Outpatient Medications on File Prior to Encounter   Medication Sig Dispense Refill    rosuvastatin (CRESTOR) 5 mg tablet TAKE 1 TABLET BY MOUTH EVERY NIGHT 30 Tablet 1    cefUROXime (CEFTIN) 250 mg tablet Take 1 Tab by mouth two (2) times a day.  14 Tab 0       Past History     Past Medical History:  Past Medical History:   Diagnosis Date    ASCVD (arteriosclerotic cardiovascular disease) 10/1/2017    CAD (coronary artery disease)     Depression 10/6/2017    Diverticulosis of intestine without bleeding 10/6/2017    With diverticulitis    Heterozygous factor V Leiden mutation ([facility] [address] 10/6/2017    Hyperlipidemia 10/1/2017    IBS (irritable bowel syndrome) 10/6/2017    Insomnia 1/16/2018    Throat pain 7/23/2020    Vestibular neuronitis of both ears 10/3/2017       Past Surgical History:  Past Surgical History:   Procedure Laterality Date    HX GI      hemmorhoidectomy    [place] EGD DELIVER THERMAL ENERGY SPHNCTR/CARDIA GERD  2017    UPPER GI ENDOSCOPY,BIOPSY  7/23/2020            Family History:  Family History   Problem Relation Age of Onset    No Known Problems Mother     Heart Disease Father     Sudden Death Other     Cancer Sister         breast       Social History:  Social History     Tobacco Use    Smoking status: Never Smoker    Smokeless tobacco: Never Used   Substance Use Topics    Alcohol use: No    Drug use: No       Allergies:  No Known Allergies      Review of Systems   Review of Systems   Constitutional: Negative for chills and fever. HENT: Negative for congestion and sore throat. Eyes: Negative for visual disturbance. Respiratory: Negative for cough and shortness of breath. Cardiovascular: Positive for palpitations. Negative for chest pain and leg swelling. Gastrointestinal: Negative for abdominal pain, blood in stool, diarrhea and nausea. Endocrine: Negative for polyuria. Genitourinary: Negative for dysuria, flank pain, vaginal bleeding and vaginal discharge. Musculoskeletal: Negative for myalgias. Skin: Negative for rash. Allergic/Immunologic: Negative for immunocompromised state. Neurological: Positive for dizziness. Negative for weakness and headaches. Psychiatric/Behavioral: Negative for confusion.        Physical Exam   Physical Exam  Vitals and nursing note reviewed. Constitutional:       Appearance: She is well-developed. HENT:      Head: Normocephalic and atraumatic. Eyes:      General:         Right eye: No discharge. Left eye: No discharge. Conjunctiva/sclera: Conjunctivae normal.      Pupils: Pupils are equal, round, and reactive to light. Neck:      Trachea: No tracheal deviation. Cardiovascular:      Rate and Rhythm: Normal rate and regular rhythm. Heart sounds: Normal heart sounds. No murmur heard. Pulmonary:      Effort: Pulmonary effort is normal. No respiratory distress. Breath sounds: Normal breath sounds. No wheezing or rales. Abdominal:      General: Bowel sounds are normal.      Palpations: Abdomen is soft. Tenderness: There is no abdominal tenderness. There is no guarding or rebound. Musculoskeletal:         General: No tenderness or deformity. Normal range of motion. Cervical back: Normal range of motion and neck supple. Skin:     General: Skin is warm and dry. Findings: No erythema or rash. Neurological:      Mental Status: She is alert and oriented to person, place, and time. Comments: Extraocular muscle movements intact, no facial droop, no slurred speech.    Psychiatric:         Behavior: Behavior normal.         Diagnostic Study Results     Labs -     Recent Results (from the past 12 hour(s))   CBC WITH AUTOMATED DIFF    Collection Time: 09/08/21 10:25 PM   Result Value Ref Range    WBC 5.5 3.6 - 11.0 K/uL    RBC 4.69 3.80 - 5.20 M/uL    HGB 14.0 11.5 - 16.0 g/dL    HCT 43.9 35.0 - 47.0 %    MCV 93.6 80.0 - 99.0 FL    MCH 29.9 26.0 - 34.0 PG    MCHC 31.9 30.0 - 36.5 g/dL    RDW 12.3 11.5 - 14.5 %    PLATELET 163 895 - 076 K/uL    MPV 9.2 8.9 - 12.9 FL    NRBC 0.0 0  WBC    ABSOLUTE NRBC 0.00 0.00 - 0.01 K/uL    NEUTROPHILS 63 32 - 75 %    LYMPHOCYTES 28 12 - 49 %    MONOCYTES 8 5 - 13 %    EOSINOPHILS 1 0 - 7 %    BASOPHILS 0 0 - 1 %    IMMATURE GRANULOCYTES 0 0.0 - 0.5 % ABS. NEUTROPHILS 3.4 1.8 - 8.0 K/UL    ABS. LYMPHOCYTES 1.5 0.8 - 3.5 K/UL    ABS. MONOCYTES 0.5 0.0 - 1.0 K/UL    ABS. EOSINOPHILS 0.1 0.0 - 0.4 K/UL    ABS. BASOPHILS 0.0 0.0 - 0.1 K/UL    ABS. IMM. GRANS. 0.0 0.00 - 0.04 K/UL    DF AUTOMATED     METABOLIC PANEL, COMPREHENSIVE    Collection Time: 09/08/21 10:25 PM   Result Value Ref Range    Sodium 139 136 - 145 mmol/L    Potassium 3.9 3.5 - 5.1 mmol/L    Chloride 104 97 - 108 mmol/L    CO2 30 21 - 32 mmol/L    Anion gap 5 5 - 15 mmol/L    Glucose 119 (H) 65 - 100 mg/dL    BUN 21 (H) 6 - 20 MG/DL    Creatinine 0.88 0.55 - 1.02 MG/DL    BUN/Creatinine ratio 24 (H) 12 - 20      GFR est AA >60 >60 ml/min/1.73m2    GFR est non-AA >60 >60 ml/min/1.73m2    Calcium 9.1 8.5 - 10.1 MG/DL    Bilirubin, total 0.6 0.2 - 1.0 MG/DL    ALT (SGPT) 26 12 - 78 U/L    AST (SGOT) 24 15 - 37 U/L    Alk. phosphatase 61 45 - 117 U/L    Protein, total 7.9 6.4 - 8.2 g/dL    Albumin 4.2 3.5 - 5.0 g/dL    Globulin 3.7 2.0 - 4.0 g/dL    A-G Ratio 1.1 1.1 - 2.2     TROPONIN I    Collection Time: 09/08/21 10:25 PM   Result Value Ref Range    Troponin-I, Qt. <0.05 <0.05 ng/mL   EKG, 12 LEAD, INITIAL    Collection Time: 09/08/21 10:26 PM   Result Value Ref Range    Ventricular Rate 72 BPM    Atrial Rate 72 BPM    P-R Interval 122 ms    QRS Duration 96 ms    Q-T Interval 406 ms    QTC Calculation (Bezet) 444 ms    Calculated P Axis 69 degrees    Calculated R Axis 6 degrees    Calculated T Axis 39 degrees    Diagnosis       Normal sinus rhythm  Possible Left atrial enlargement  When compared with ECG of 30-SEP-2017 12:22,  No significant change was found         Radiologic Studies -   XR CHEST PORT    (Results Pending)     CT Results  (Last 48 hours)    None        CXR Results  (Last 48 hours)    None            Medical Decision Making   I am the first provider for this patient.     I reviewed the vital signs, available nursing notes, past medical history, past surgical history, family history and social history. Vital Signs-Reviewed the patient's vital signs. Patient Vitals for the past 12 hrs:   Temp Pulse Resp BP SpO2   09/08/21 2221 98.4 °F (36.9 °C) 75 18 (!) 161/120 98 %     EKG interpretation: Sinus rhythm, rate 72. Normal axis. Normal intervals. No evidence of acute ischemic ST-T wave changes. Interpreted by me      Records Reviewed:   Nursing notes, Prior visits     Provider Notes (Medical Decision Making):   Patient evaluated found to be in no acute cardiopulmonary distress, EKG is reassuring. Cardiac biomarkers negative. Patient is likely having symptomatic PVCs, she reports she has a history of the same. Regarding her lightheadedness, vertigo symptoms I think that she needs to follow-up with her doctor who manages this. I do not think there is anything new or acute going on. No need for any head imaging at this time, the normal neuro exam.    ED Course:   Initial assessment performed. The patients presenting problems have been discussed, and they are in agreement with the care plan formulated and outlined with them. I have encouraged them to ask questions as they arise throughout their visit. Critical Care Time:   none    Disposition:    DISCHARGE NOTE  Patients results have been reviewed with them. Patient and/or family have verbally conveyed their understanding and agreement of the patient's signs, symptoms, diagnosis, treatment and prognosis and additionally agree to follow up as recommended or return to the Emergency Room should their condition change or have any new concerns prior to their follow-up appointment. Patient verbally agrees with the care-plan and verbally conveys that all of their questions have been answered.    Discharge instructions have also been provided to the patient with some educational information regarding their diagnosis as well a list of reasons why they would want to return to the ER prior to their follow-up appointment should their condition change. PLAN:  1. Current Discharge Medication List        2. Follow-up Information     Follow up With Specialties Details Why Contact Info    Read MD [facility] Schedule an appointment as soon as possible for a visit   [address]  [id number]  [phone]      [facility] EMERGENCY DEPT Emergency Medicine  If symptoms worsen [address] [place] [address]  [phone]    Mikey Herring MD Internal Medicine Schedule an appointment as soon as possible for a visit   [address] [place] [address]  [phone]            Return to ED if worse     Diagnosis     Clinical Impression:   1. Palpitations    2. PVC (premature ventricular contraction)        Attestations:   This note was completed by Flor Washington DO

## 2022-03-18 PROBLEM — R07.0 THROAT PAIN: Status: ACTIVE | Noted: 2020-07-23

## 2022-03-18 PROBLEM — K57.90 DIVERTICULOSIS OF INTESTINE WITHOUT BLEEDING: Status: ACTIVE | Noted: 2017-10-06

## 2022-03-18 PROBLEM — R42 DIZZINESS: Status: ACTIVE | Noted: 2017-10-01

## 2022-03-18 PROBLEM — H81.22 VESTIBULAR NEURONITIS OF LEFT EAR: Status: ACTIVE | Noted: 2017-10-03

## 2022-03-19 PROBLEM — R11.2 NAUSEA & VOMITING: Status: ACTIVE | Noted: 2017-10-01

## 2022-03-19 PROBLEM — R00.2 PALPITATIONS: Status: ACTIVE | Noted: 2018-07-18

## 2022-03-19 PROBLEM — D68.51 HETEROZYGOUS FACTOR V LEIDEN MUTATION (HCC): Status: ACTIVE | Noted: 2017-10-06

## 2022-03-19 PROBLEM — G47.00 INSOMNIA: Status: ACTIVE | Noted: 2018-01-16

## 2022-03-19 PROBLEM — I25.10 ASCVD (ARTERIOSCLEROTIC CARDIOVASCULAR DISEASE): Status: ACTIVE | Noted: 2017-10-01

## 2022-03-19 PROBLEM — F32.A DEPRESSION: Status: ACTIVE | Noted: 2017-10-06

## 2022-03-19 PROBLEM — K58.9 IBS (IRRITABLE BOWEL SYNDROME): Status: ACTIVE | Noted: 2017-10-06

## 2022-09-15 ENCOUNTER — OFFICE VISIT (OUTPATIENT)
Dept: ORTHOPEDIC SURGERY | Age: 72
End: 2022-09-15

## 2022-09-15 VITALS — WEIGHT: 132 LBS | HEIGHT: 66 IN | BODY MASS INDEX: 21.21 KG/M2

## 2022-09-15 DIAGNOSIS — M79.671 RIGHT FOOT PAIN: Primary | ICD-10-CM

## 2022-09-15 DIAGNOSIS — M54.16 LUMBAR RADICULOPATHY: ICD-10-CM

## 2022-09-15 PROCEDURE — G8427 DOCREV CUR MEDS BY ELIG CLIN: HCPCS | Performed by: STUDENT IN AN ORGANIZED HEALTH CARE EDUCATION/TRAINING PROGRAM

## 2022-09-15 PROCEDURE — G8536 NO DOC ELDER MAL SCRN: HCPCS | Performed by: STUDENT IN AN ORGANIZED HEALTH CARE EDUCATION/TRAINING PROGRAM

## 2022-09-15 PROCEDURE — G8420 CALC BMI NORM PARAMETERS: HCPCS | Performed by: STUDENT IN AN ORGANIZED HEALTH CARE EDUCATION/TRAINING PROGRAM

## 2022-09-15 PROCEDURE — 3017F COLORECTAL CA SCREEN DOC REV: CPT | Performed by: STUDENT IN AN ORGANIZED HEALTH CARE EDUCATION/TRAINING PROGRAM

## 2022-09-15 PROCEDURE — 99203 OFFICE O/P NEW LOW 30 MIN: CPT | Performed by: STUDENT IN AN ORGANIZED HEALTH CARE EDUCATION/TRAINING PROGRAM

## 2022-09-15 PROCEDURE — G9717 DOC PT DX DEP/BP F/U NT REQ: HCPCS | Performed by: STUDENT IN AN ORGANIZED HEALTH CARE EDUCATION/TRAINING PROGRAM

## 2022-09-15 PROCEDURE — 1123F ACP DISCUSS/DSCN MKR DOCD: CPT | Performed by: STUDENT IN AN ORGANIZED HEALTH CARE EDUCATION/TRAINING PROGRAM

## 2022-09-15 PROCEDURE — 1090F PRES/ABSN URINE INCON ASSESS: CPT | Performed by: STUDENT IN AN ORGANIZED HEALTH CARE EDUCATION/TRAINING PROGRAM

## 2022-09-15 PROCEDURE — 1101F PT FALLS ASSESS-DOCD LE1/YR: CPT | Performed by: STUDENT IN AN ORGANIZED HEALTH CARE EDUCATION/TRAINING PROGRAM

## 2022-09-15 PROCEDURE — G8400 PT W/DXA NO RESULTS DOC: HCPCS | Performed by: STUDENT IN AN ORGANIZED HEALTH CARE EDUCATION/TRAINING PROGRAM

## 2022-09-21 NOTE — PROGRESS NOTES
Arcadio Jimenez (: 1950) is a 73 y.o. female, patient, here for evaluation of the following chief complaint(s):  LOW BACK PAIN       ASSESSMENT/PLAN:  Below is the assessment and plan developed based on review of pertinent history, physical exam, labs, studies, and medications. Patient presents today for evaluation of intermittent right foot pain, as well as intermittent bilateral low back pain with radiation of pain into the lateral aspect of right thigh. No weakness noted on physical exam.  Radiologic findings reviewed in detail with the patient. She states the symptoms do not significantly affect her life, and tend to wax and wane intermittently. Extensive discussion of treatment options with the patient. At this point, she will complete a round of formal physical therapy. Referral provided today. She does not wish to start any new medications at this time. She may take over-the-counter medications as needed. She will follow-up in about 8 weeks. 1. Right foot pain  -     XR SPINE LUMB MIN 4 V; Future  -     REFERRAL TO PHYSICAL THERAPY  2. Lumbar radiculopathy      No follow-ups on file. SUBJECTIVE/OBJECTIVE:  Arcadio Jimenez (: 1950) is a 73 y.o. female. Pain Assessment  9/15/2022   Location of Pain Foot   Location Modifiers Right   Quality of Pain Sharp        Patient presents today with a main complaint of right foot pain. The symptoms have been ongoing for many years. She previously was seeing a chiropractor, and which was adequately managing her symptoms. [facility], and was referred to our office for evaluation of her back as a possible cause for her symptoms. She does report an intermittent history of radiation of pain into the lateral aspect of her right thigh, as well as intermittent back pain. Her pain is mainly in her right foot, she states that it tends to change specific locations.  She is not taking any medications for this problem. She has not had physical therapy yet. Denies acute changes in bowel or bladder control. Currently denies any lower extremity weakness, but states that sometimes her right leg will give out on her. Imaging:    XR Results (most recent):  Results from Appointment encounter on 09/15/22    XR SPINE LUMB MIN 4 V    Narrative  AP, lateral, flexion and extension views of the lumbar spine reveal multilevel disc degeneration, retrolisthesis at L2-3 measuring approximately 4 mm which appears relatively stable in flexion and extension. Retrolisthesis at L3-4 measuring approximately 4 mm which appears relatively stable in flexion and extension. No evidence of obvious acute fractures or lytic lesions. No Known Allergies    Current Outpatient Medications   Medication Sig    rosuvastatin (CRESTOR) 5 mg tablet TAKE 1 TABLET BY MOUTH EVERY NIGHT    cefUROXime (CEFTIN) 250 mg tablet Take 1 Tab by mouth two (2) times a day. No current facility-administered medications for this visit.        Past Medical History:   Diagnosis Date    ASCVD (arteriosclerotic cardiovascular disease) 10/1/2017    CAD (coronary artery disease)     Depression 10/6/2017    Diverticulosis of intestine without bleeding 10/6/2017    With diverticulitis    Heterozygous factor V Leiden mutation ([facility] [address] 10/6/2017    Hyperlipidemia 10/1/2017    IBS (irritable bowel syndrome) 10/6/2017    Insomnia 1/16/2018    Throat pain 7/23/2020    Vestibular neuronitis of both ears 10/3/2017        Past Surgical History:   Procedure Laterality Date    HX GI      hemmorhoidectomy    UT EGD DELIVER THERMAL ENERGY SPHNCTR/CARDIA GERD  2017    UPPER GI ENDOSCOPY,BIOPSY  7/23/2020            Family History   Problem Relation Age of Onset    No Known Problems Mother     Heart Disease Father     Sudden Death Other     Cancer Sister         breast        Social History     Tobacco Use    Smoking status: Never    Smokeless tobacco: Never   Substance Use Topics Alcohol use: No    Drug use: No        Review of Systems   Constitutional:  Negative for chills and fever. Musculoskeletal:  Positive for arthralgias and back pain. Negative for gait problem. Neurological:  Positive for weakness and numbness. All other systems reviewed and are negative. Vitals:  Ht 5' 6\" (1.676 m)   Wt 132 lb (59.9 kg)   BMI 21.31 kg/m²    Body mass index is 21.31 kg/m². Physical Exam  Neurologic  Sensory  Light Touch - Intact - Globally. Overall Assessment of Muscle Strength and Tone reveals  Lower Extremities - Right Iliopsoas - 5/5. Left Iliopsoas - 5/5. Right Tibialis Anterior - 5/5. Left Tibialis Anterior - 5/5. Right Gastroc-Soleus - 5/5. Left Gastroc-Soleus - 5/5. Right EHL - 5/5. Left EHL - 5/5. General Assessment of Reflexes  Right Ankle - Clonus is not present. Left Ankle - Clonus is not present. Reflexes (Dermatomes)  2/2 Normal - Left Achilles (L5-S2), Left Knee (L2-4), Right Achilles (L5-S2) and Right Knee (L2-4). Musculoskeletal  Global Assessment  Examination of related systems reveals - well-developed, well-nourished, in no acute distress, alert and oriented x 3. Gait and Station - normal gait and station and normal posture. Right Lower Extremity - normal strength and tone, normal range of motion without pain and no instability, subluxation or laxity. Left Lower Extremity - normal strength and tone, normal range of motion without pain and no instability, subluxation or laxity. Spine/Ribs/Pelvis  Cervical Spine - Examination of the cervical spine reveals - no tenderness to palpation, no pain, no swelling, edema or erythema, normal cervical spine movements and normal sensation. Thoracic (Dorsal) Spine - Examination of the thoracic spine reveals - no tenderness over thoracic vertebrae, no pain, normal sensation and normal thoracic spine movements. Lumbosacral Spine - Examination of the lumbosacral spine reveals - no known fractures or deformities.  Inspection and Palpation - Tenderness -mild. Assessment of pain reveals the following findings - The pain is characterized as -mild. Location - pain refers to lower back bilaterally. ROJM - Trunk Extension - 15 degrees. Lumbar Spine Flexion - 35 °. Lumbosacral Spine - Functional Testing - Babinski Test negative, Prone Knee Bending Test negative, Slump Test negative, Straight Leg Raising Test negative. Dr. Candido Rowley was available for immediate consult during this encounter. An electronic signature was used to authenticate this note.   -- MOON Spencer

## 2023-01-15 NOTE — TELEPHONE ENCOUNTER
Patient is calling, she states that she believes that she has a UTI. When using the restroom her urine is cloudy, and when wiping afterward she is experiencing a little bit of pink tint on the tissue. She would like to know what to do about this.     Best call back # for patient: [phone] Pt updated on plan of care, family at bedside, Dr. Claudia Mercer at bedside to eval patient.       Sander Smith RN  01/15/23 [id number]

## 2023-05-21 RX ORDER — ROSUVASTATIN CALCIUM 5 MG/1
1 TABLET, COATED ORAL NIGHTLY
COMMUNITY
Start: 2021-07-04

## 2023-05-21 RX ORDER — CEFUROXIME AXETIL 250 MG/1
250 TABLET ORAL 2 TIMES DAILY
COMMUNITY
Start: 2020-12-31

## 2023-07-14 ENCOUNTER — HOSPITAL ENCOUNTER (OUTPATIENT)
Facility: HOSPITAL | Age: 73
Discharge: HOME | End: 2023-07-14
Payer: MEDICARE

## 2023-07-14 DIAGNOSIS — I10 HYPERTENSION, UNSPECIFIED TYPE: ICD-10-CM

## 2023-07-14 DIAGNOSIS — R93.1 AGATSTON CORONARY ARTERY CALCIUM SCORE GREATER THAN 400: ICD-10-CM

## 2023-07-14 DIAGNOSIS — R42 DIZZINESS: ICD-10-CM

## 2023-07-14 LAB
STRESS BASELINE DIAS BP: 75 MMHG
STRESS BASELINE HR: 56 BPM
STRESS BASELINE ST DEPRESSION: 0 MM
STRESS BASELINE SYS BP: 189 MMHG
STRESS ESTIMATED WORKLOAD: 7 METS
STRESS O2 SAT PEAK: 95 %
STRESS O2 SAT REST: 95 %
STRESS PEAK DIAS BP: 75 MMHG
STRESS PEAK SYS BP: 189 MMHG
STRESS PERCENT HR ACHIEVED: 87 %
STRESS POST PEAK HR: 131 BPM
STRESS RATE PRESSURE PRODUCT: NORMAL BPM*MMHG
STRESS TARGET HR: 150 BPM

## 2023-07-14 PROCEDURE — 93017 CV STRESS TEST TRACING ONLY: CPT

## 2023-09-08 ENCOUNTER — HOSPITAL ENCOUNTER (OUTPATIENT)
Facility: HOSPITAL | Age: 73
Discharge: HOME | End: 2023-09-08
Payer: MEDICARE

## 2023-09-08 VITALS
HEIGHT: 66 IN | WEIGHT: 132.06 LBS | SYSTOLIC BLOOD PRESSURE: 161 MMHG | BODY MASS INDEX: 21.22 KG/M2 | DIASTOLIC BLOOD PRESSURE: 120 MMHG

## 2023-09-08 DIAGNOSIS — R42 DIZZINESS: ICD-10-CM

## 2023-09-08 DIAGNOSIS — R93.1 AGATSTON CORONARY ARTERY CALCIUM SCORE GREATER THAN 400: ICD-10-CM

## 2023-09-08 DIAGNOSIS — I10 HYPERTENSION, UNSPECIFIED TYPE: ICD-10-CM

## 2023-09-08 LAB
ECHO AR MAX VEL PISA: 3.4 M/S
ECHO AV AREA PEAK VELOCITY: 2.9 CM2
ECHO AV AREA VTI: 3 CM2
ECHO AV AREA/BSA PEAK VELOCITY: 1.7 CM2/M2
ECHO AV AREA/BSA VTI: 1.8 CM2/M2
ECHO AV MEAN GRADIENT: 5 MMHG
ECHO AV MEAN VELOCITY: 1 M/S
ECHO AV PEAK GRADIENT: 10 MMHG
ECHO AV PEAK VELOCITY: 1.6 M/S
ECHO AV REGURGITANT PHT: 841.5 MILLISECOND
ECHO AV VELOCITY RATIO: 0.94
ECHO AV VTI: 29.9 CM
ECHO BSA: 1.67 M2
ECHO LA DIAMETER INDEX: 2.14 CM/M2
ECHO LA DIAMETER: 3.6 CM
ECHO LA VOL 2C: 50 ML (ref 22–52)
ECHO LA VOL 2C: 53 ML (ref 22–52)
ECHO LA VOL 4C: 33 ML (ref 22–52)
ECHO LA VOL 4C: 35 ML (ref 22–52)
ECHO LA VOLUME AREA LENGTH: 45 ML
ECHO LA VOLUME INDEX AREA LENGTH: 27 ML/M2 (ref 16–34)
ECHO LV E' LATERAL VELOCITY: 11 CM/S
ECHO LV E' SEPTAL VELOCITY: 8 CM/S
ECHO LV EDV A4C: 72 ML
ECHO LV EDV INDEX A4C: 43 ML/M2
ECHO LV EJECTION FRACTION A4C: 57 %
ECHO LV ESV A4C: 31 ML
ECHO LV ESV INDEX A4C: 18 ML/M2
ECHO LV FRACTIONAL SHORTENING: 37 % (ref 28–44)
ECHO LV INTERNAL DIMENSION DIASTOLE INDEX: 3.04 CM/M2
ECHO LV INTERNAL DIMENSION DIASTOLIC: 5.1 CM (ref 3.9–5.3)
ECHO LV INTERNAL DIMENSION SYSTOLIC INDEX: 1.9 CM/M2
ECHO LV INTERNAL DIMENSION SYSTOLIC: 3.2 CM
ECHO LV IVSD: 0.7 CM (ref 0.6–0.9)
ECHO LV MASS 2D: 118.7 G (ref 67–162)
ECHO LV MASS INDEX 2D: 70.7 G/M2 (ref 43–95)
ECHO LV POSTERIOR WALL DIASTOLIC: 0.7 CM (ref 0.6–0.9)
ECHO LV RELATIVE WALL THICKNESS RATIO: 0.27
ECHO LVOT AREA: 3.1 CM2
ECHO LVOT AV VTI INDEX: 0.99
ECHO LVOT DIAM: 2 CM
ECHO LVOT MEAN GRADIENT: 4 MMHG
ECHO LVOT PEAK GRADIENT: 9 MMHG
ECHO LVOT PEAK VELOCITY: 1.5 M/S
ECHO LVOT STROKE VOLUME INDEX: 55.1 ML/M2
ECHO LVOT SV: 92.6 ML
ECHO LVOT VTI: 29.5 CM
ECHO MV A VELOCITY: 0.77 M/S
ECHO MV AREA VTI: 3.1 CM2
ECHO MV E DECELERATION TIME (DT): 215.1 MS
ECHO MV E VELOCITY: 0.88 M/S
ECHO MV E/A RATIO: 1.14
ECHO MV E/E' LATERAL: 8
ECHO MV E/E' RATIO (AVERAGED): 9.5
ECHO MV E/E' SEPTAL: 11
ECHO MV LVOT VTI INDEX: 1.03
ECHO MV MAX VELOCITY: 0.9 M/S
ECHO MV MEAN GRADIENT: 1 MMHG
ECHO MV MEAN VELOCITY: 0.5 M/S
ECHO MV PEAK GRADIENT: 4 MMHG
ECHO MV VTI: 30.3 CM
ECHO PULMONARY ARTERY END DIASTOLIC PRESSURE: 2 MMHG
ECHO PV REGURGITANT MAX VELOCITY: 0.8 M/S
ECHO RV FREE WALL PEAK S': 16 CM/S
ECHO RV TAPSE: 2.8 CM (ref 1.7–?)
ECHO TV REGURGITANT MAX VELOCITY: 2.12 M/S
ECHO TV REGURGITANT PEAK GRADIENT: 18 MMHG

## 2023-09-08 PROCEDURE — 93306 TTE W/DOPPLER COMPLETE: CPT

## (undated) DEVICE — Device

## (undated) DEVICE — BASIN EMSIS 16OZ GRAPHITE PLAS KID SHP MOLD GRAD FOR ORAL

## (undated) DEVICE — SYRINGE 50ML E/T

## (undated) DEVICE — CONTAINER SPEC 20 ML LID NEUT BUFF FORMALIN 10 % POLYPR STS

## (undated) DEVICE — NEEDLE HYPO 18GA L1.5IN PNK S STL HUB POLYPR SHLD REG BVL

## (undated) DEVICE — SYR 10ML LUER LOK 1/5ML GRAD --

## (undated) DEVICE — BLOCK BITE ENDOSCP AD 21 MM W/ DIL BLU LF DISP

## (undated) DEVICE — ELECTRODE,RADIOTRANSLUCENT,FOAM,5PK: Brand: MEDLINE

## (undated) DEVICE — SET ADMIN 16ML TBNG L100IN 2 Y INJ SITE IV PIGGY BK DISP

## (undated) DEVICE — BAG SPEC BIOHZRD 10 X 10 IN --

## (undated) DEVICE — TOWEL 4 PLY TISS 19X30 SUE WHT

## (undated) DEVICE — SYR 3ML LL TIP 1/10ML GRAD --

## (undated) DEVICE — BRUSH CYTO BRONCHSCP 1.5/140MM -- CELLEBRITY

## (undated) DEVICE — 1200 GUARD II KIT W/5MM TUBE W/O VAC TUBE: Brand: GUARDIAN

## (undated) DEVICE — Z DISCONTINUED NO SUB IDED CAPSULE PH GASTROENTEROLOGY ES MON FOR REFLX DEL SYS BRAVO

## (undated) DEVICE — CATH IV AUTOGRD BC PNK 20GA 25 -- INSYTE

## (undated) DEVICE — FORCEPS BX L160CM DIA8MM GRSP DISECT CUP TIP NONLOCKING ROT

## (undated) DEVICE — SOLIDIFIER MEDC 1200ML -- CONVERT TO 356117

## (undated) DEVICE — YANKAUER,TAPERED BULBOUS TIP,W/O VENT: Brand: MEDLINE

## (undated) DEVICE — Z DISCONTINUED PER MEDLINE LINE GAS SAMPLING O2/CO2 LNG AD 13 FT NSL W/ TBNG FILTERLINE

## (undated) DEVICE — NEONATAL-ADULT SPO2 SENSOR: Brand: NELLCOR